# Patient Record
Sex: MALE | Race: WHITE | NOT HISPANIC OR LATINO
[De-identification: names, ages, dates, MRNs, and addresses within clinical notes are randomized per-mention and may not be internally consistent; named-entity substitution may affect disease eponyms.]

---

## 2017-05-09 ENCOUNTER — APPOINTMENT (OUTPATIENT)
Dept: DERMATOLOGY | Facility: CLINIC | Age: 57
End: 2017-05-09

## 2018-02-20 ENCOUNTER — APPOINTMENT (OUTPATIENT)
Dept: DERMATOLOGY | Facility: CLINIC | Age: 58
End: 2018-02-20
Payer: COMMERCIAL

## 2018-02-20 PROCEDURE — 99214 OFFICE O/P EST MOD 30 MIN: CPT

## 2018-10-25 ENCOUNTER — OUTPATIENT (OUTPATIENT)
Dept: OUTPATIENT SERVICES | Facility: HOSPITAL | Age: 58
LOS: 1 days | Discharge: HOME | End: 2018-10-25
Payer: COMMERCIAL

## 2018-10-25 VITALS
HEART RATE: 85 BPM | TEMPERATURE: 98 F | HEIGHT: 72 IN | DIASTOLIC BLOOD PRESSURE: 88 MMHG | SYSTOLIC BLOOD PRESSURE: 119 MMHG | WEIGHT: 190.04 LBS | OXYGEN SATURATION: 100 % | RESPIRATION RATE: 20 BRPM

## 2018-10-25 DIAGNOSIS — Z98.890 OTHER SPECIFIED POSTPROCEDURAL STATES: Chronic | ICD-10-CM

## 2018-10-25 DIAGNOSIS — M50.123 CERVICAL DISC DISORDER AT C6-C7 LEVEL WITH RADICULOPATHY: ICD-10-CM

## 2018-10-25 DIAGNOSIS — M43.22 FUSION OF SPINE, CERVICAL REGION: Chronic | ICD-10-CM

## 2018-10-25 DIAGNOSIS — M50.223 OTHER CERVICAL DISC DISPLACEMENT AT C6-C7 LEVEL: ICD-10-CM

## 2018-10-25 DIAGNOSIS — K21.9 GASTRO-ESOPHAGEAL REFLUX DISEASE WITHOUT ESOPHAGITIS: ICD-10-CM

## 2018-10-25 DIAGNOSIS — F32.9 MAJOR DEPRESSIVE DISORDER, SINGLE EPISODE, UNSPECIFIED: ICD-10-CM

## 2018-10-25 DIAGNOSIS — J44.9 CHRONIC OBSTRUCTIVE PULMONARY DISEASE, UNSPECIFIED: ICD-10-CM

## 2018-10-25 DIAGNOSIS — M48.02 SPINAL STENOSIS, CERVICAL REGION: ICD-10-CM

## 2018-10-25 DIAGNOSIS — F41.9 ANXIETY DISORDER, UNSPECIFIED: ICD-10-CM

## 2018-10-25 DIAGNOSIS — Z98.1 ARTHRODESIS STATUS: Chronic | ICD-10-CM

## 2018-10-25 DIAGNOSIS — Z01.818 ENCOUNTER FOR OTHER PREPROCEDURAL EXAMINATION: ICD-10-CM

## 2018-10-25 DIAGNOSIS — Z90.89 ACQUIRED ABSENCE OF OTHER ORGANS: Chronic | ICD-10-CM

## 2018-10-25 DIAGNOSIS — D69.3 IMMUNE THROMBOCYTOPENIC PURPURA: ICD-10-CM

## 2018-10-25 DIAGNOSIS — Z29.9 ENCOUNTER FOR PROPHYLACTIC MEASURES, UNSPECIFIED: ICD-10-CM

## 2018-10-25 LAB
ANION GAP SERPL CALC-SCNC: 3 MMOL/L — LOW (ref 5–17)
APPEARANCE UR: CLEAR — SIGNIFICANT CHANGE UP
APTT BLD: 35.7 SEC — SIGNIFICANT CHANGE UP (ref 27.5–37.4)
BACTERIA # UR AUTO: NEGATIVE — SIGNIFICANT CHANGE UP
BASOPHILS # BLD AUTO: 0.03 K/UL — SIGNIFICANT CHANGE UP (ref 0–0.2)
BASOPHILS NFR BLD AUTO: 0.7 % — SIGNIFICANT CHANGE UP (ref 0–2)
BILIRUB UR-MCNC: NEGATIVE — SIGNIFICANT CHANGE UP
BLD GP AB SCN SERPL QL: SIGNIFICANT CHANGE UP
BUN SERPL-MCNC: 24 MG/DL — HIGH (ref 7–23)
CALCIUM SERPL-MCNC: 8.9 MG/DL — SIGNIFICANT CHANGE UP (ref 8.5–10.1)
CHLORIDE SERPL-SCNC: 109 MMOL/L — HIGH (ref 96–108)
CO2 SERPL-SCNC: 30 MMOL/L — SIGNIFICANT CHANGE UP (ref 22–31)
COLOR SPEC: YELLOW — SIGNIFICANT CHANGE UP
CREAT SERPL-MCNC: 0.88 MG/DL — SIGNIFICANT CHANGE UP (ref 0.5–1.3)
DIFF PNL FLD: NEGATIVE — SIGNIFICANT CHANGE UP
EOSINOPHIL # BLD AUTO: 0.07 K/UL — SIGNIFICANT CHANGE UP (ref 0–0.5)
EOSINOPHIL NFR BLD AUTO: 1.6 % — SIGNIFICANT CHANGE UP (ref 0–6)
EPI CELLS # UR: NEGATIVE — SIGNIFICANT CHANGE UP
GLUCOSE SERPL-MCNC: 106 MG/DL — HIGH (ref 70–99)
GLUCOSE UR QL: NEGATIVE MG/DL — SIGNIFICANT CHANGE UP
HCT VFR BLD CALC: 45.1 % — SIGNIFICANT CHANGE UP (ref 39–50)
HGB BLD-MCNC: 14.8 G/DL — SIGNIFICANT CHANGE UP (ref 13–17)
IMM GRANULOCYTES NFR BLD AUTO: 0.2 % — SIGNIFICANT CHANGE UP (ref 0–1.5)
INR BLD: 1 RATIO — SIGNIFICANT CHANGE UP (ref 0.88–1.16)
KETONES UR-MCNC: ABNORMAL
LEUKOCYTE ESTERASE UR-ACNC: ABNORMAL
LYMPHOCYTES # BLD AUTO: 1.04 K/UL — SIGNIFICANT CHANGE UP (ref 1–3.3)
LYMPHOCYTES # BLD AUTO: 23.9 % — SIGNIFICANT CHANGE UP (ref 13–44)
MCHC RBC-ENTMCNC: 29.2 PG — SIGNIFICANT CHANGE UP (ref 27–34)
MCHC RBC-ENTMCNC: 32.8 GM/DL — SIGNIFICANT CHANGE UP (ref 32–36)
MCV RBC AUTO: 89 FL — SIGNIFICANT CHANGE UP (ref 80–100)
MONOCYTES # BLD AUTO: 0.49 K/UL — SIGNIFICANT CHANGE UP (ref 0–0.9)
MONOCYTES NFR BLD AUTO: 11.3 % — SIGNIFICANT CHANGE UP (ref 2–14)
MRSA PCR RESULT.: SIGNIFICANT CHANGE UP
NEUTROPHILS # BLD AUTO: 2.71 K/UL — SIGNIFICANT CHANGE UP (ref 1.8–7.4)
NEUTROPHILS NFR BLD AUTO: 62.3 % — SIGNIFICANT CHANGE UP (ref 43–77)
NITRITE UR-MCNC: NEGATIVE — SIGNIFICANT CHANGE UP
NRBC # BLD: 0 /100 WBCS — SIGNIFICANT CHANGE UP (ref 0–0)
PH UR: 5 — SIGNIFICANT CHANGE UP (ref 5–8)
PLATELET # BLD AUTO: 118 K/UL — LOW (ref 150–400)
POTASSIUM SERPL-MCNC: 5.1 MMOL/L — SIGNIFICANT CHANGE UP (ref 3.5–5.3)
POTASSIUM SERPL-SCNC: 5.1 MMOL/L — SIGNIFICANT CHANGE UP (ref 3.5–5.3)
PROT UR-MCNC: 15 MG/DL
PROTHROM AB SERPL-ACNC: 10.8 SEC — SIGNIFICANT CHANGE UP (ref 9.8–12.7)
RBC # BLD: 5.07 M/UL — SIGNIFICANT CHANGE UP (ref 4.2–5.8)
RBC # FLD: 13.1 % — SIGNIFICANT CHANGE UP (ref 10.3–14.5)
RBC CASTS # UR COMP ASSIST: SIGNIFICANT CHANGE UP /HPF (ref 0–4)
S AUREUS DNA NOSE QL NAA+PROBE: SIGNIFICANT CHANGE UP
SODIUM SERPL-SCNC: 142 MMOL/L — SIGNIFICANT CHANGE UP (ref 135–145)
SP GR SPEC: 1.02 — SIGNIFICANT CHANGE UP (ref 1.01–1.02)
TYPE + AB SCN PNL BLD: SIGNIFICANT CHANGE UP
UROBILINOGEN FLD QL: NEGATIVE MG/DL — SIGNIFICANT CHANGE UP
WBC # BLD: 4.35 K/UL — SIGNIFICANT CHANGE UP (ref 3.8–10.5)
WBC # FLD AUTO: 4.35 K/UL — SIGNIFICANT CHANGE UP (ref 3.8–10.5)
WBC UR QL: SIGNIFICANT CHANGE UP

## 2018-10-25 PROCEDURE — 93010 ELECTROCARDIOGRAM REPORT: CPT

## 2018-10-25 NOTE — H&P PST ADULT - HISTORY OF PRESENT ILLNESS
59 y/o male with cervical spinal stenosis. Complain of 57 y/o male with cervical spinal stenosis. Pt reports history of cervical fusion in 2012. Complain of chronic neck pain. Takes oxycodone with mild pain relief. Pt stated "I had the shots and the ablation too but it did not relieve the pain." Scheduled for C6-7 cervical diskectomy fusion, instrumentation, allograft.

## 2018-10-25 NOTE — H&P PST ADULT - PSH
Cervical vertebral fusion  05/2012  H/O arthroscopy of left knee  04/2017  H/O bilateral inguinal hernia repair  2015  History of arthroscopy of right shoulder  rotator cuff repair 05/2018  History of lumbar spinal fusion  2014 TLIF  History of tonsillectomy    S/P ACL repair  left knee

## 2018-10-25 NOTE — H&P PST ADULT - PMH
Anxiety    Cataract    Chronic ITP (idiopathic thrombocytopenia)    Depression    Diverticulosis    GERD (gastroesophageal reflux disease)    Spinal stenosis of cervical region

## 2018-10-25 NOTE — H&P PST ADULT - NSANTHOSAYNRD_GEN_A_CORE
No. CARLOS ALBERTO screening performed.  STOP BANG Legend: 0-2 = LOW Risk; 3-4 = INTERMEDIATE Risk; 5-8 = HIGH Risk

## 2018-10-25 NOTE — H&P PST ADULT - FAMILY HISTORY
Father  Still living? Unknown  Family history of Alzheimer's disease, Age at diagnosis: Age Unknown     Mother  Still living? Unknown  Family history of colon cancer in mother, Age at diagnosis: Age Unknown     Sibling  Still living? Unknown  Family history of Alzheimer's disease, Age at diagnosis: Age Unknown  Family history of CLL (chronic lymphoid leukemia), Age at diagnosis: Age Unknown

## 2018-10-26 ENCOUNTER — TRANSCRIPTION ENCOUNTER (OUTPATIENT)
Age: 58
End: 2018-10-26

## 2018-10-26 LAB
CULTURE RESULTS: NO GROWTH — SIGNIFICANT CHANGE UP
SPECIMEN SOURCE: SIGNIFICANT CHANGE UP

## 2018-11-07 ENCOUNTER — RESULT REVIEW (OUTPATIENT)
Age: 58
End: 2018-11-07

## 2018-11-07 ENCOUNTER — OUTPATIENT (OUTPATIENT)
Dept: OUTPATIENT SERVICES | Facility: HOSPITAL | Age: 58
LOS: 1 days | Discharge: HOME | End: 2018-11-07
Payer: COMMERCIAL

## 2018-11-07 VITALS
OXYGEN SATURATION: 100 % | DIASTOLIC BLOOD PRESSURE: 86 MMHG | WEIGHT: 190.04 LBS | SYSTOLIC BLOOD PRESSURE: 128 MMHG | HEIGHT: 72 IN | TEMPERATURE: 98 F | RESPIRATION RATE: 14 BRPM | HEART RATE: 80 BPM

## 2018-11-07 DIAGNOSIS — Z98.890 OTHER SPECIFIED POSTPROCEDURAL STATES: Chronic | ICD-10-CM

## 2018-11-07 DIAGNOSIS — M43.22 FUSION OF SPINE, CERVICAL REGION: Chronic | ICD-10-CM

## 2018-11-07 DIAGNOSIS — Z90.89 ACQUIRED ABSENCE OF OTHER ORGANS: Chronic | ICD-10-CM

## 2018-11-07 DIAGNOSIS — Z98.1 ARTHRODESIS STATUS: Chronic | ICD-10-CM

## 2018-11-07 PROCEDURE — 88304 TISSUE EXAM BY PATHOLOGIST: CPT | Mod: 26

## 2018-11-07 RX ORDER — VANCOMYCIN HCL 1 G
1000 VIAL (EA) INTRAVENOUS ONCE
Qty: 0 | Refills: 0 | Status: COMPLETED | OUTPATIENT
Start: 2018-11-07 | End: 2018-11-07

## 2018-11-07 RX ORDER — BENZOCAINE AND MENTHOL 5; 1 G/100ML; G/100ML
1 LIQUID ORAL
Qty: 0 | Refills: 0 | Status: DISCONTINUED | OUTPATIENT
Start: 2018-11-07 | End: 2018-11-08

## 2018-11-07 RX ORDER — HYDROMORPHONE HYDROCHLORIDE 2 MG/ML
0.5 INJECTION INTRAMUSCULAR; INTRAVENOUS; SUBCUTANEOUS
Qty: 0 | Refills: 0 | Status: DISCONTINUED | OUTPATIENT
Start: 2018-11-07 | End: 2018-11-08

## 2018-11-07 RX ORDER — SODIUM CHLORIDE 9 MG/ML
1000 INJECTION, SOLUTION INTRAVENOUS
Qty: 0 | Refills: 0 | Status: DISCONTINUED | OUTPATIENT
Start: 2018-11-07 | End: 2018-11-07

## 2018-11-07 RX ORDER — ACETAMINOPHEN 500 MG
650 TABLET ORAL EVERY 6 HOURS
Qty: 0 | Refills: 0 | Status: DISCONTINUED | OUTPATIENT
Start: 2018-11-07 | End: 2018-11-08

## 2018-11-07 RX ORDER — OXYCODONE HYDROCHLORIDE 5 MG/1
10 TABLET ORAL EVERY 4 HOURS
Qty: 0 | Refills: 0 | Status: DISCONTINUED | OUTPATIENT
Start: 2018-11-07 | End: 2018-11-08

## 2018-11-07 RX ORDER — CLONAZEPAM 1 MG
0.5 TABLET ORAL
Qty: 0 | Refills: 0 | Status: DISCONTINUED | OUTPATIENT
Start: 2018-11-07 | End: 2018-11-08

## 2018-11-07 RX ORDER — ASCORBIC ACID 60 MG
500 TABLET,CHEWABLE ORAL
Qty: 0 | Refills: 0 | Status: DISCONTINUED | OUTPATIENT
Start: 2018-11-07 | End: 2018-11-08

## 2018-11-07 RX ORDER — DOCUSATE SODIUM 100 MG
100 CAPSULE ORAL THREE TIMES A DAY
Qty: 0 | Refills: 0 | Status: DISCONTINUED | OUTPATIENT
Start: 2018-11-07 | End: 2018-11-08

## 2018-11-07 RX ORDER — FENTANYL CITRATE 50 UG/ML
50 INJECTION INTRAVENOUS
Qty: 0 | Refills: 0 | Status: DISCONTINUED | OUTPATIENT
Start: 2018-11-07 | End: 2018-11-07

## 2018-11-07 RX ORDER — OXYCODONE HYDROCHLORIDE 5 MG/1
5 TABLET ORAL EVERY 4 HOURS
Qty: 0 | Refills: 0 | Status: DISCONTINUED | OUTPATIENT
Start: 2018-11-07 | End: 2018-11-08

## 2018-11-07 RX ORDER — FAMOTIDINE 10 MG/ML
20 INJECTION INTRAVENOUS EVERY 24 HOURS
Qty: 0 | Refills: 0 | Status: DISCONTINUED | OUTPATIENT
Start: 2018-11-07 | End: 2018-11-08

## 2018-11-07 RX ORDER — DIPHENHYDRAMINE HCL 50 MG
25 CAPSULE ORAL EVERY 4 HOURS
Qty: 0 | Refills: 0 | Status: DISCONTINUED | OUTPATIENT
Start: 2018-11-07 | End: 2018-11-08

## 2018-11-07 RX ORDER — SODIUM CHLORIDE 9 MG/ML
1000 INJECTION, SOLUTION INTRAVENOUS
Qty: 0 | Refills: 0 | Status: DISCONTINUED | OUTPATIENT
Start: 2018-11-07 | End: 2018-11-08

## 2018-11-07 RX ORDER — OXYCODONE HYDROCHLORIDE 5 MG/1
10 TABLET ORAL ONCE
Qty: 0 | Refills: 0 | Status: DISCONTINUED | OUTPATIENT
Start: 2018-11-07 | End: 2018-11-07

## 2018-11-07 RX ORDER — FOLIC ACID 0.8 MG
1 TABLET ORAL DAILY
Qty: 0 | Refills: 0 | Status: DISCONTINUED | OUTPATIENT
Start: 2018-11-07 | End: 2018-11-08

## 2018-11-07 RX ORDER — ONDANSETRON 8 MG/1
4 TABLET, FILM COATED ORAL ONCE
Qty: 0 | Refills: 0 | Status: DISCONTINUED | OUTPATIENT
Start: 2018-11-07 | End: 2018-11-07

## 2018-11-07 RX ADMIN — OXYCODONE HYDROCHLORIDE 10 MILLIGRAM(S): 5 TABLET ORAL at 13:57

## 2018-11-07 RX ADMIN — FENTANYL CITRATE 50 MICROGRAM(S): 50 INJECTION INTRAVENOUS at 14:56

## 2018-11-07 RX ADMIN — FENTANYL CITRATE 50 MICROGRAM(S): 50 INJECTION INTRAVENOUS at 16:00

## 2018-11-07 RX ADMIN — FENTANYL CITRATE 50 MICROGRAM(S): 50 INJECTION INTRAVENOUS at 13:57

## 2018-11-07 RX ADMIN — HYDROMORPHONE HYDROCHLORIDE 0.5 MILLIGRAM(S): 2 INJECTION INTRAMUSCULAR; INTRAVENOUS; SUBCUTANEOUS at 22:32

## 2018-11-07 RX ADMIN — Medication 1 MILLIGRAM(S): at 18:28

## 2018-11-07 RX ADMIN — Medication 250 MILLIGRAM(S): at 23:35

## 2018-11-07 RX ADMIN — FENTANYL CITRATE 50 MICROGRAM(S): 50 INJECTION INTRAVENOUS at 14:13

## 2018-11-07 RX ADMIN — HYDROMORPHONE HYDROCHLORIDE 0.5 MILLIGRAM(S): 2 INJECTION INTRAMUSCULAR; INTRAVENOUS; SUBCUTANEOUS at 22:12

## 2018-11-07 RX ADMIN — FAMOTIDINE 20 MILLIGRAM(S): 10 INJECTION INTRAVENOUS at 23:36

## 2018-11-07 RX ADMIN — FENTANYL CITRATE 50 MICROGRAM(S): 50 INJECTION INTRAVENOUS at 16:30

## 2018-11-07 RX ADMIN — OXYCODONE HYDROCHLORIDE 10 MILLIGRAM(S): 5 TABLET ORAL at 18:27

## 2018-11-07 RX ADMIN — OXYCODONE HYDROCHLORIDE 10 MILLIGRAM(S): 5 TABLET ORAL at 13:52

## 2018-11-07 RX ADMIN — FENTANYL CITRATE 50 MICROGRAM(S): 50 INJECTION INTRAVENOUS at 13:54

## 2018-11-07 RX ADMIN — FENTANYL CITRATE 50 MICROGRAM(S): 50 INJECTION INTRAVENOUS at 14:55

## 2018-11-07 RX ADMIN — Medication 1 TABLET(S): at 18:27

## 2018-11-07 RX ADMIN — Medication 1 TABLET(S): at 23:37

## 2018-11-07 RX ADMIN — Medication 100 MILLIGRAM(S): at 23:36

## 2018-11-07 RX ADMIN — Medication 500 MILLIGRAM(S): at 18:28

## 2018-11-07 NOTE — PHYSICAL THERAPY INITIAL EVALUATION ADULT - CRITERIA FOR SKILLED THERAPEUTIC INTERVENTIONS
functional limitations in following categories/predicted duration of therapy intervention/anticipated equipment needs at discharge/risk reduction/prevention/anticipated discharge recommendation/impairments found/rehab potential/therapy frequency

## 2018-11-07 NOTE — PHYSICAL THERAPY INITIAL EVALUATION ADULT - PLANNED THERAPY INTERVENTIONS, PT EVAL
bed mobility training/gait training/orthotic fitting/training/stairs ,neck protection ,fall prevention/transfer training

## 2018-11-07 NOTE — PHYSICAL THERAPY INITIAL EVALUATION ADULT - REFERRING PHYSICIAN, REHAB EVAL
Dr ASHU Dejesus 11/7/18 @1401pm Dr Trina Coats DO (Ortho Resident) Attending :Dr ASHU Dejesus 11/7/18 @1401pm

## 2018-11-07 NOTE — PHYSICAL THERAPY INITIAL EVALUATION ADULT - GENERAL OBSERVATIONS, REHAB EVAL
recumbent in bed with soft c-collar in place,nasal o2 ,Flowtron cuffs BLEs but no pump yet,awake,alert,Ox3

## 2018-11-07 NOTE — PHYSICAL THERAPY INITIAL EVALUATION ADULT - OCCUPATION
pt is disabled since first neck surgery 2013 ,had worked as  for [employer], [employer] prior to that [employer] ; served in [employer]

## 2018-11-07 NOTE — PHYSICAL THERAPY INITIAL EVALUATION ADULT - PERTINENT HX OF CURRENT PROBLEM, REHAB EVAL
C/S stenosis,persistent progressive posterior neck pain radiating down to interscapular area despite pain management

## 2018-11-07 NOTE — ASU PATIENT PROFILE, ADULT - PROVIDER NOTIFICATION
----- Message from Barrington Bishop NP sent at 5/31/2018  8:05 AM CDT -----  Patient needs to get back on crestor and repeat cmp lipid in 3mo then f/u  Vit D low, needs 50,000 units weekly x6 weeks then 2000 units otc daily  
Pt notified  
Declines

## 2018-11-07 NOTE — BRIEF OPERATIVE NOTE - PROCEDURE
<<-----Click on this checkbox to enter Procedure Anterior cervical discectomy and fusion (ACDF) with plating and bone graft at single level  11/07/2018  C6-7 anterior cervical diskectomy fusion instrumentation allograft  [facility]

## 2018-11-07 NOTE — PHYSICAL THERAPY INITIAL EVALUATION ADULT - PATIENT PROFILE REVIEW, REHAB EVAL
Activity level:ambulate with assistance,soft cervical collar at all times/yes Activity level: ambulate with assistance, soft cervical collar at all times/yes

## 2018-11-08 ENCOUNTER — TRANSCRIPTION ENCOUNTER (OUTPATIENT)
Age: 58
End: 2018-11-08

## 2018-11-08 VITALS
DIASTOLIC BLOOD PRESSURE: 71 MMHG | TEMPERATURE: 98 F | SYSTOLIC BLOOD PRESSURE: 118 MMHG | RESPIRATION RATE: 16 BRPM | HEART RATE: 88 BPM | OXYGEN SATURATION: 94 %

## 2018-11-08 RX ADMIN — OXYCODONE HYDROCHLORIDE 5 MILLIGRAM(S): 5 TABLET ORAL at 05:28

## 2018-11-08 RX ADMIN — OXYCODONE HYDROCHLORIDE 10 MILLIGRAM(S): 5 TABLET ORAL at 03:03

## 2018-11-08 RX ADMIN — Medication 500 MILLIGRAM(S): at 05:30

## 2018-11-08 RX ADMIN — OXYCODONE HYDROCHLORIDE 10 MILLIGRAM(S): 5 TABLET ORAL at 03:33

## 2018-11-08 RX ADMIN — OXYCODONE HYDROCHLORIDE 10 MILLIGRAM(S): 5 TABLET ORAL at 09:35

## 2018-11-08 RX ADMIN — OXYCODONE HYDROCHLORIDE 5 MILLIGRAM(S): 5 TABLET ORAL at 05:58

## 2018-11-08 RX ADMIN — Medication 100 MILLIGRAM(S): at 05:28

## 2018-11-08 RX ADMIN — Medication 0.5 MILLIGRAM(S): at 05:28

## 2018-11-08 RX ADMIN — Medication 1 TABLET(S): at 05:30

## 2018-11-08 NOTE — PROGRESS NOTE ADULT - SUBJECTIVE AND OBJECTIVE BOX
Patient seen and examined. Pain controlled. No acute events.  Pt notes mild dysphagia, no difficulty breathing/SOB.     Allergies    penicillins (Other (Severe))    Intolerances      Vital Signs Last 24 Hrs  T(C): 36.4 (11-08-18 @ 03:10), Max: 36.7 (11-07-18 @ 16:15)  T(F): 97.5 (11-08-18 @ 03:10), Max: 98.1 (11-07-18 @ 17:55)  HR: 88 (11-08-18 @ 03:10) (80 - 104)  BP: 118/71 (11-08-18 @ 03:10) (101/59 - 133/79)  BP(mean): --  RR: 16 (11-08-18 @ 03:10) (11 - 18)  SpO2: 94% (11-08-18 @ 03:10) (94% - 100%)    Physical Exam    Gen: NAD    Spine:   Dressing c/d/i, +soft collar in place    Motor:            ElbowFlex    WristExt   ElbowExt   FingerFlex   FingerAbd     R            5/5                5/5            5/5             5/5               5/5  L            5/5                5/5            5/5             5/5               5/5              HipFlex   HipExt   KneeFlex   KneeExt   AnkleDorsi   AnklePlantar   HaluxDorsi   HaluxPlantar  R        5/5        5/5            5/5            5/5             5/5                 5/5                    5/5                5/5  L         5/5        5/5            5/5            5/5             5/5                 5/5                    5/5                5/5      Sensory:            C5         C6         C7      C8       T1        (0=absent, 1=impaired, 2=normal, NT=not testable)  R         2            2           2        2          2  L          2            2           2        2          2               L2          L3         L4      L5       S1         (0=absent, 1=impaired, 2=normal, NT=not testable)  R       2            2           2        2          2  L         2            2           2        2          2      DP +  Compartments soft  No calf ttp    A/P: 58y Male s/p C6-7 ACDF POD 1  Pain control  DVT ppx- SCDs  PT/OT, WBAT  FU labs  Dispo planning

## 2018-11-08 NOTE — DISCHARGE NOTE ADULT - CARE PROVIDER_API CALL
Ivan Dejesus), Orthopaedic Surgery  [address] [place] [address]  Phone: [phone]  Fax: [phone]

## 2018-11-08 NOTE — PROGRESS NOTE ADULT - SUBJECTIVE AND OBJECTIVE BOX
POD1  Doing well  Mild dysphagia  Ambulated  No new UE issues  Soft collar in place    AF, VSS  Moving arms strongly  Sensation intact  Dressing anterior C spine dry    A/P  S/p C6-7 anterior cervical diskectomy and fusion POD1  Pt is stable for DC home  All question answered  Wife at bedside

## 2018-11-08 NOTE — DISCHARGE NOTE ADULT - CARE PLAN
Principal Discharge DX:	Cervical vertebral fusion  Goal:	improve pain  Assessment and plan of treatment:	Keep incisional site clean and dry. Avoid soaking incisional site.

## 2018-11-08 NOTE — PROGRESS NOTE ADULT - SUBJECTIVE AND OBJECTIVE BOX
POD#1. Pt seen resting in bed. Pt has mild pain of the posterior aspect of cervical region which is tolerable. Pt denies upper extremities pain, numbness, and weakness. Pt voided on own without complications. No dysphagia.    PE  Gen appearance: NAD  Motor strength: 5/5 of b/l upper ext  Sensation: intact  No calf tenderness bilaterally  Incisional site: clean and dry.    Plan  VSS, Afebrile  Stable for DC home  Post-op pain meds will be sent from office EMR POD#1. Pt seen resting in bed. Pt has mild pain of the posterior aspect of cervical region which is tolerable. Pt denies upper extremities pain, numbness, and weakness. Pt voided on own without complications. No dysphagia.    PE  Gen appearance: NAD  Motor strength: 5/5 of b/l upper ext  Sensation: intact  No calf tenderness bilaterally  Incisional site: clean and dry.    Plan  VSS, Afebrile  Stable for DC home  Pt has pain meds prescribed by Pain management and has enough. Pt will f/u with Pain management.

## 2018-11-08 NOTE — DISCHARGE NOTE ADULT - PATIENT PORTAL LINK FT
You can access the [facility] Patient Portal, offered by [facility], by registering with the following website: [email or web address]

## 2018-11-08 NOTE — DISCHARGE NOTE ADULT - HOSPITAL COURSE
11/7/2018: s/p C6-7 ACDF, previous C5-6 ACDF  11/8/2018: POD#1. Pt seen resting in bed. Pt has mild pain of the posterior aspect of cervical region which is tolerable. Pt denies upper extremities pain, numbness, and weakness. Pt voided on own without complications. No dysphagia.    PE  Gen appearance: NAD  Motor strength: 5/5 of b/l upper ext  Sensation: intact  No calf tenderness bilaterally  Incisional site: clean and dry.    Plan  VSS, Afebrile  Stable for DC home  Post-op pain meds will be sent from office EMR 11/7/2018: s/p C6-7 ACDF, previous C5-6 ACDF  11/8/2018: POD#1. Pt seen resting in bed. Pt has mild pain of the posterior aspect of cervical region which is tolerable. Pt denies upper extremities pain, numbness, and weakness. Pt voided on own without complications. No dysphagia.    PE  Gen appearance: NAD  Motor strength: 5/5 of b/l upper ext  Sensation: intact  No calf tenderness bilaterally  Incisional site: clean and dry.    Plan  VSS, Afebrile  Stable for DC home  Pt has pain meds prescribed by Pain management and has enough. Pt will f/u with Pain management.

## 2018-11-08 NOTE — DISCHARGE NOTE ADULT - MEDICATION SUMMARY - MEDICATIONS TO STOP TAKING
I will STOP taking the medications listed below when I get home from the hospital:    oxyCODONE-acetaminophen 5 mg-325 mg oral tablet  -- 1 tab(s) by mouth 2 times a day, As Needed

## 2018-11-08 NOTE — DISCHARGE NOTE ADULT - MEDICATION SUMMARY - MEDICATIONS TO TAKE
I will START or STAY ON the medications listed below when I get home from the hospital:    clonazePAM 2 mg oral tablet  -- 0.5 tab(s) by mouth 2 times a day  -- Indication: For Home med    nefazodone 150 mg oral tablet  -- 1 tab(s) by mouth once a day  -- Indication: For Home med    raNITIdine 150 mg oral capsule  -- 1 cap(s) by mouth once a day, As Needed  -- Indication: For Home

## 2018-11-09 LAB — SURGICAL PATHOLOGY FINAL REPORT - CH: SIGNIFICANT CHANGE UP

## 2018-11-13 DIAGNOSIS — F41.9 ANXIETY DISORDER, UNSPECIFIED: ICD-10-CM

## 2018-11-13 DIAGNOSIS — Z87.891 PERSONAL HISTORY OF NICOTINE DEPENDENCE: ICD-10-CM

## 2018-11-13 DIAGNOSIS — Z80.6 FAMILY HISTORY OF LEUKEMIA: ICD-10-CM

## 2018-11-13 DIAGNOSIS — Z88.0 ALLERGY STATUS TO PENICILLIN: ICD-10-CM

## 2018-11-13 DIAGNOSIS — K21.9 GASTRO-ESOPHAGEAL REFLUX DISEASE WITHOUT ESOPHAGITIS: ICD-10-CM

## 2018-11-13 DIAGNOSIS — M50.123 CERVICAL DISC DISORDER AT C6-C7 LEVEL WITH RADICULOPATHY: ICD-10-CM

## 2018-11-13 DIAGNOSIS — J44.9 CHRONIC OBSTRUCTIVE PULMONARY DISEASE, UNSPECIFIED: ICD-10-CM

## 2018-11-13 DIAGNOSIS — D69.3 IMMUNE THROMBOCYTOPENIC PURPURA: ICD-10-CM

## 2018-11-13 DIAGNOSIS — M48.02 SPINAL STENOSIS, CERVICAL REGION: ICD-10-CM

## 2018-11-13 DIAGNOSIS — E78.00 PURE HYPERCHOLESTEROLEMIA, UNSPECIFIED: ICD-10-CM

## 2018-11-13 DIAGNOSIS — F32.9 MAJOR DEPRESSIVE DISORDER, SINGLE EPISODE, UNSPECIFIED: ICD-10-CM

## 2018-11-13 DIAGNOSIS — L50.9 URTICARIA, UNSPECIFIED: ICD-10-CM

## 2018-11-13 DIAGNOSIS — Z80.0 FAMILY HISTORY OF MALIGNANT NEOPLASM OF DIGESTIVE ORGANS: ICD-10-CM

## 2018-11-13 DIAGNOSIS — Z98.1 ARTHRODESIS STATUS: ICD-10-CM

## 2018-11-13 DIAGNOSIS — L30.9 DERMATITIS, UNSPECIFIED: ICD-10-CM

## 2018-12-06 PROBLEM — K21.9 GASTRO-ESOPHAGEAL REFLUX DISEASE WITHOUT ESOPHAGITIS: Chronic | Status: ACTIVE | Noted: 2018-10-25

## 2018-12-06 PROBLEM — D69.3 IMMUNE THROMBOCYTOPENIC PURPURA: Chronic | Status: ACTIVE | Noted: 2018-10-25

## 2018-12-06 PROBLEM — F41.9 ANXIETY DISORDER, UNSPECIFIED: Chronic | Status: ACTIVE | Noted: 2018-10-25

## 2018-12-06 PROBLEM — F32.9 MAJOR DEPRESSIVE DISORDER, SINGLE EPISODE, UNSPECIFIED: Chronic | Status: ACTIVE | Noted: 2018-10-25

## 2018-12-06 PROBLEM — H26.9 UNSPECIFIED CATARACT: Chronic | Status: ACTIVE | Noted: 2018-10-25

## 2018-12-06 PROBLEM — M48.02 SPINAL STENOSIS, CERVICAL REGION: Chronic | Status: ACTIVE | Noted: 2018-10-25

## 2018-12-06 PROBLEM — K57.90 DIVERTICULOSIS OF INTESTINE, PART UNSPECIFIED, WITHOUT PERFORATION OR ABSCESS WITHOUT BLEEDING: Chronic | Status: ACTIVE | Noted: 2018-10-25

## 2018-12-11 ENCOUNTER — APPOINTMENT (OUTPATIENT)
Dept: DERMATOLOGY | Facility: CLINIC | Age: 58
End: 2018-12-11
Payer: COMMERCIAL

## 2018-12-11 PROCEDURE — 99214 OFFICE O/P EST MOD 30 MIN: CPT | Mod: 25

## 2018-12-11 PROCEDURE — 17000 DESTRUCT PREMALG LESION: CPT

## 2018-12-11 PROCEDURE — 17003 DESTRUCT PREMALG LES 2-14: CPT

## 2019-03-22 ENCOUNTER — APPOINTMENT (OUTPATIENT)
Dept: DERMATOLOGY | Facility: CLINIC | Age: 59
End: 2019-03-22
Payer: COMMERCIAL

## 2019-03-22 PROCEDURE — 99212 OFFICE O/P EST SF 10 MIN: CPT

## 2020-01-07 ENCOUNTER — TRANSCRIPTION ENCOUNTER (OUTPATIENT)
Age: 60
End: 2020-01-07

## 2020-01-21 ENCOUNTER — EMERGENCY (EMERGENCY)
Facility: HOSPITAL | Age: 60
LOS: 1 days | Discharge: HOME | End: 2020-01-21
Attending: EMERGENCY MEDICINE
Payer: MEDICARE

## 2020-01-21 VITALS
TEMPERATURE: 97 F | SYSTOLIC BLOOD PRESSURE: 152 MMHG | HEIGHT: 71 IN | RESPIRATION RATE: 18 BRPM | WEIGHT: 214.95 LBS | DIASTOLIC BLOOD PRESSURE: 89 MMHG | HEART RATE: 81 BPM | OXYGEN SATURATION: 97 %

## 2020-01-21 DIAGNOSIS — M43.22 FUSION OF SPINE, CERVICAL REGION: Chronic | ICD-10-CM

## 2020-01-21 DIAGNOSIS — Z98.890 OTHER SPECIFIED POSTPROCEDURAL STATES: Chronic | ICD-10-CM

## 2020-01-21 DIAGNOSIS — Z98.1 ARTHRODESIS STATUS: Chronic | ICD-10-CM

## 2020-01-21 DIAGNOSIS — Z90.89 ACQUIRED ABSENCE OF OTHER ORGANS: Chronic | ICD-10-CM

## 2020-01-21 LAB
ALBUMIN SERPL ELPH-MCNC: 4.5 G/DL — SIGNIFICANT CHANGE UP (ref 3.3–5.2)
ALP SERPL-CCNC: 76 U/L — SIGNIFICANT CHANGE UP (ref 40–120)
ALT FLD-CCNC: 28 U/L — SIGNIFICANT CHANGE UP
ANION GAP SERPL CALC-SCNC: 12 MMOL/L — SIGNIFICANT CHANGE UP (ref 5–17)
AST SERPL-CCNC: 26 U/L — SIGNIFICANT CHANGE UP
BASOPHILS # BLD AUTO: 0.02 K/UL — SIGNIFICANT CHANGE UP (ref 0–0.2)
BASOPHILS NFR BLD AUTO: 0.2 % — SIGNIFICANT CHANGE UP (ref 0–2)
BILIRUB SERPL-MCNC: 0.4 MG/DL — SIGNIFICANT CHANGE UP (ref 0.4–2)
BUN SERPL-MCNC: 19 MG/DL — SIGNIFICANT CHANGE UP (ref 8–20)
CALCIUM SERPL-MCNC: 9.3 MG/DL — SIGNIFICANT CHANGE UP (ref 8.6–10.2)
CHLORIDE SERPL-SCNC: 101 MMOL/L — SIGNIFICANT CHANGE UP (ref 98–107)
CO2 SERPL-SCNC: 28 MMOL/L — SIGNIFICANT CHANGE UP (ref 22–29)
CREAT SERPL-MCNC: 0.84 MG/DL — SIGNIFICANT CHANGE UP (ref 0.5–1.3)
EOSINOPHIL # BLD AUTO: 0.01 K/UL — SIGNIFICANT CHANGE UP (ref 0–0.5)
EOSINOPHIL NFR BLD AUTO: 0.1 % — SIGNIFICANT CHANGE UP (ref 0–6)
GLUCOSE SERPL-MCNC: 152 MG/DL — HIGH (ref 70–99)
HCT VFR BLD CALC: 45 % — SIGNIFICANT CHANGE UP (ref 39–50)
HGB BLD-MCNC: 14.8 G/DL — SIGNIFICANT CHANGE UP (ref 13–17)
IMM GRANULOCYTES NFR BLD AUTO: 0.2 % — SIGNIFICANT CHANGE UP (ref 0–1.5)
LYMPHOCYTES # BLD AUTO: 0.8 K/UL — LOW (ref 1–3.3)
LYMPHOCYTES # BLD AUTO: 8.3 % — LOW (ref 13–44)
MCHC RBC-ENTMCNC: 28.3 PG — SIGNIFICANT CHANGE UP (ref 27–34)
MCHC RBC-ENTMCNC: 32.9 GM/DL — SIGNIFICANT CHANGE UP (ref 32–36)
MCV RBC AUTO: 86 FL — SIGNIFICANT CHANGE UP (ref 80–100)
MONOCYTES # BLD AUTO: 0.55 K/UL — SIGNIFICANT CHANGE UP (ref 0–0.9)
MONOCYTES NFR BLD AUTO: 5.7 % — SIGNIFICANT CHANGE UP (ref 2–14)
NEUTROPHILS # BLD AUTO: 8.2 K/UL — HIGH (ref 1.8–7.4)
NEUTROPHILS NFR BLD AUTO: 85.5 % — HIGH (ref 43–77)
PLATELET # BLD AUTO: 107 K/UL — LOW (ref 150–400)
POTASSIUM SERPL-MCNC: 5.3 MMOL/L — SIGNIFICANT CHANGE UP (ref 3.5–5.3)
POTASSIUM SERPL-SCNC: 5.3 MMOL/L — SIGNIFICANT CHANGE UP (ref 3.5–5.3)
PROT SERPL-MCNC: 7 G/DL — SIGNIFICANT CHANGE UP (ref 6.6–8.7)
RBC # BLD: 5.23 M/UL — SIGNIFICANT CHANGE UP (ref 4.2–5.8)
RBC # FLD: 13 % — SIGNIFICANT CHANGE UP (ref 10.3–14.5)
SODIUM SERPL-SCNC: 141 MMOL/L — SIGNIFICANT CHANGE UP (ref 135–145)
WBC # BLD: 9.6 K/UL — SIGNIFICANT CHANGE UP (ref 3.8–10.5)
WBC # FLD AUTO: 9.6 K/UL — SIGNIFICANT CHANGE UP (ref 3.8–10.5)

## 2020-01-21 PROCEDURE — 96361 HYDRATE IV INFUSION ADD-ON: CPT

## 2020-01-21 PROCEDURE — 71046 X-RAY EXAM CHEST 2 VIEWS: CPT | Mod: 26

## 2020-01-21 PROCEDURE — 93005 ELECTROCARDIOGRAM TRACING: CPT

## 2020-01-21 PROCEDURE — 85027 COMPLETE CBC AUTOMATED: CPT

## 2020-01-21 PROCEDURE — 99284 EMERGENCY DEPT VISIT MOD MDM: CPT | Mod: 25

## 2020-01-21 PROCEDURE — 99284 EMERGENCY DEPT VISIT MOD MDM: CPT

## 2020-01-21 PROCEDURE — 80053 COMPREHEN METABOLIC PANEL: CPT

## 2020-01-21 PROCEDURE — 93010 ELECTROCARDIOGRAM REPORT: CPT

## 2020-01-21 PROCEDURE — 81001 URINALYSIS AUTO W/SCOPE: CPT

## 2020-01-21 PROCEDURE — 71046 X-RAY EXAM CHEST 2 VIEWS: CPT

## 2020-01-21 PROCEDURE — 96374 THER/PROPH/DIAG INJ IV PUSH: CPT

## 2020-01-21 PROCEDURE — 36415 COLL VENOUS BLD VENIPUNCTURE: CPT

## 2020-01-21 RX ORDER — DIPHENHYDRAMINE HCL 50 MG
25 CAPSULE ORAL ONCE
Refills: 0 | Status: COMPLETED | OUTPATIENT
Start: 2020-01-21 | End: 2020-01-21

## 2020-01-21 RX ORDER — ONDANSETRON 8 MG/1
4 TABLET, FILM COATED ORAL ONCE
Refills: 0 | Status: COMPLETED | OUTPATIENT
Start: 2020-01-21 | End: 2020-01-21

## 2020-01-21 RX ORDER — SODIUM CHLORIDE 9 MG/ML
1000 INJECTION INTRAMUSCULAR; INTRAVENOUS; SUBCUTANEOUS ONCE
Refills: 0 | Status: COMPLETED | OUTPATIENT
Start: 2020-01-21 | End: 2020-01-21

## 2020-01-21 RX ADMIN — SODIUM CHLORIDE 1000 MILLILITER(S): 9 INJECTION INTRAMUSCULAR; INTRAVENOUS; SUBCUTANEOUS at 22:45

## 2020-01-21 RX ADMIN — SODIUM CHLORIDE 1000 MILLILITER(S): 9 INJECTION INTRAMUSCULAR; INTRAVENOUS; SUBCUTANEOUS at 21:45

## 2020-01-21 RX ADMIN — Medication 25 MILLIGRAM(S): at 22:39

## 2020-01-21 NOTE — ED ADULT NURSE NOTE - OBJECTIVE STATEMENT
Assumed pt care, pt sitting in stretcher, no acute distress noted. Pt states today he started using Suboxone to stop using his Percocet 5/325, pt took first dose of Suboxone at 4pm today, had feelings of dry mouth, feeling "drunk", unable to talk/walk normally, feeling very "off," and began feeling SOB, came into ED at that time.

## 2020-01-21 NOTE — ED PROVIDER NOTE - CLINICAL SUMMARY MEDICAL DECISION MAKING FREE TEXT BOX
pt with multiple symptoms after taking suboxone x 1 for the first time.  at this time pe normal  will treat symptomatically and reassess

## 2020-01-21 NOTE — ED PROVIDER NOTE - OBJECTIVE STATEMENT
60 yo male with hx of multiple cervical spine operations, including fusions. Has been on percocet one pill three times a day for a long time and wanted to come off of it. He was at his psychiatrist, who he sees for anxiety, and the psychiatrist, who also does addiction medicine, offered to write a script for suboxone. Pt did not take the med after the script was written 4 days ago. This afternoon he took the suboxone and as per patient, within an hour, he felt " wasted". He says he could not drive and felt sob. He states he felt out of body. He also describes throat tightness and says he could not swallow. Admits to emesis. Family drove him here for evaluation.  Med hx chronic pain, anxiety  Soc hx no cig

## 2020-01-21 NOTE — ED PROVIDER NOTE - PROGRESS NOTE DETAILS
ambulatory and tolerating liquids  drowsy from benadryl  safe to dc  no other meds tongiht  fu with psych tomorrow

## 2020-01-21 NOTE — ED STATDOCS - PROGRESS NOTE DETAILS
[facility] for ED attending, Dr. Sequeira. 60 y/o male with PMHx of Anxiety, ITP, Depression, Diverticulosis, GERD, and Spinal Stenosis presents to ED c/o medication reaction. Patient states at 530am today, he took a Percocet, then 4pm he took Suboxone 8mg. States that 1 hour after he took the Suboxone, he felt tired, felt he couldn't swallow, SOB, anxious, couldn't walk right, couldn't eat, became nauseous and vomited. Focused eval, protocol orders entered. Pt to be moved to main ED for further evaluation by another provider.

## 2020-01-21 NOTE — ED STATDOCS - OBJECTIVE STATEMENT
60 y/o male with PMHx of Anxiety, ITP, Depression, Diverticulosis, GERD, and Spinal Stenosis presents to ED c/o medication reaction. Patient states at 530am today, he took a Percocet, then 4pm he took Suboxone 8mg. States that 1 hour after he took the Suboxone, he felt tired, felt he couldn't swallow, SOB, anxious, couldn't walk right, couldn't eat, became nauseous and vomited. Focused eval, protocol orders entered. Pt to be moved to main ED for further evaluation by another provider.

## 2020-01-21 NOTE — ED PROVIDER NOTE - NSFOLLOWUPINSTRUCTIONS_ED_ALL_ED_FT
home and rest  follow up with the psychiatrist tomorrow  do not take any more suboxone  return for any problems

## 2020-01-21 NOTE — ED ADULT TRIAGE NOTE - CHIEF COMPLAINT QUOTE
pt presents c/o taking buprenorp-nalox today at 4pm and in the past hour, started feeling mouth and throat dryness. denies swelling to tongue or throat. denies hives or feeling itchy. pt able to talk without difficulty. no difficulty breathing noted in triage. dry oral mucosa noted in triage.

## 2020-01-21 NOTE — ED PROVIDER NOTE - PATIENT PORTAL LINK FT
You can access the FollowMyHealth Patient Portal offered by [facility] by registering at the following website: [email or web address]. By joining [employer]’s FollowMyHealth portal, you will also be able to view your health information using other applications (apps) compatible with our system.

## 2020-01-22 VITALS
OXYGEN SATURATION: 96 % | SYSTOLIC BLOOD PRESSURE: 148 MMHG | DIASTOLIC BLOOD PRESSURE: 68 MMHG | HEART RATE: 76 BPM | RESPIRATION RATE: 18 BRPM

## 2020-01-22 LAB
APPEARANCE UR: CLEAR — SIGNIFICANT CHANGE UP
BILIRUB UR-MCNC: NEGATIVE — SIGNIFICANT CHANGE UP
COLOR SPEC: YELLOW — SIGNIFICANT CHANGE UP
DIFF PNL FLD: NEGATIVE — SIGNIFICANT CHANGE UP
GLUCOSE UR QL: NEGATIVE MG/DL — SIGNIFICANT CHANGE UP
KETONES UR-MCNC: NEGATIVE — SIGNIFICANT CHANGE UP
LEUKOCYTE ESTERASE UR-ACNC: NEGATIVE — SIGNIFICANT CHANGE UP
NITRITE UR-MCNC: NEGATIVE — SIGNIFICANT CHANGE UP
PH UR: 6 — SIGNIFICANT CHANGE UP (ref 5–8)
PROT UR-MCNC: 15 MG/DL
SP GR SPEC: 1.02 — SIGNIFICANT CHANGE UP (ref 1.01–1.02)
UROBILINOGEN FLD QL: NEGATIVE MG/DL — SIGNIFICANT CHANGE UP

## 2020-02-26 ENCOUNTER — APPOINTMENT (OUTPATIENT)
Dept: DERMATOLOGY | Facility: CLINIC | Age: 60
End: 2020-02-26
Payer: MEDICARE

## 2020-02-26 PROCEDURE — 99214 OFFICE O/P EST MOD 30 MIN: CPT | Mod: 25

## 2020-02-26 PROCEDURE — 17110 DESTRUCTION B9 LES UP TO 14: CPT

## 2020-07-11 ENCOUNTER — APPOINTMENT (OUTPATIENT)
Dept: DISASTER EMERGENCY | Facility: CLINIC | Age: 60
End: 2020-07-11

## 2020-07-12 DIAGNOSIS — Z01.818 ENCOUNTER FOR OTHER PREPROCEDURAL EXAMINATION: ICD-10-CM

## 2020-07-13 ENCOUNTER — APPOINTMENT (OUTPATIENT)
Dept: DISASTER EMERGENCY | Facility: CLINIC | Age: 60
End: 2020-07-13

## 2020-07-14 LAB — SARS-COV-2 N GENE NPH QL NAA+PROBE: NOT DETECTED

## 2020-08-15 ENCOUNTER — APPOINTMENT (OUTPATIENT)
Dept: DISASTER EMERGENCY | Facility: CLINIC | Age: 60
End: 2020-08-15

## 2020-08-16 LAB — SARS-COV-2 N GENE NPH QL NAA+PROBE: NOT DETECTED

## 2020-08-27 NOTE — DISCHARGE NOTE ADULT - FUNCTIONAL SCREEN CURRENT LEVEL: AMBULATION, MLM
I read Tabby on her cell phone she is doing well.  She did have some nausea last night and took a couple of the Zofran ODT.  She has had some crackers yesterday and toast today but overall is feeling well.  She has voided well.  Slept well.  No complaints.  Discussed that we will see her back on the 10th as scheduled as long she is doing well if she has any problems in the meantime she should give us a call and she voiced understanding.  
2 = assistive person

## 2020-11-10 NOTE — ED ADULT NURSE NOTE - SUICIDE SCREENING QUESTION 2
Called patient and she has already been scheduled for diagnostic mammogram and breast ultrasound but would like an explanation as to what was found on screening mammogram.  Will send message to [email or web address].   No

## 2021-01-22 ENCOUNTER — APPOINTMENT (OUTPATIENT)
Dept: PULMONOLOGY | Facility: CLINIC | Age: 61
End: 2021-01-22
Payer: MEDICARE

## 2021-01-22 VITALS
OXYGEN SATURATION: 98 % | SYSTOLIC BLOOD PRESSURE: 130 MMHG | WEIGHT: 206 LBS | DIASTOLIC BLOOD PRESSURE: 78 MMHG | HEIGHT: 72 IN | HEART RATE: 74 BPM | TEMPERATURE: 97.9 F | BODY MASS INDEX: 27.9 KG/M2

## 2021-01-22 DIAGNOSIS — Z23 ENCOUNTER FOR IMMUNIZATION: ICD-10-CM

## 2021-01-22 PROCEDURE — 99204 OFFICE O/P NEW MOD 45 MIN: CPT

## 2021-01-22 RX ORDER — CLONAZEPAM 2 MG/1
2 TABLET ORAL
Refills: 0 | Status: ACTIVE | COMMUNITY

## 2021-01-22 NOTE — HISTORY OF PRESENT ILLNESS
[TextBox_4] : 59 yo man referred by Dr Calix\par Has seen a doctor in this practice ?? 25 years ago and told her had COPD and possibly CARLOS ALBERTO but never treated\par \par Smoker 2-3 ppd until 20 years ago\par CXR have been nondiagnostic, last in Jan 2020\par \par Patient has snoring and his wife says that sometimes he stops breathing\par \par Retired \par  33 year, one son\par \par Recent eval for arrythnmia--Dr Villarreal--has monitor on his chest\par On Diltiazem 120 for one month\par Hx Spine surgery--cerv and lumbar\par Follows with Dr Boateng--pain management\par Hx anxiety on Serazone/Clonazepine\par No hx allergies\par Recent cutaneous zoster

## 2021-01-22 NOTE — CONSULT LETTER
[Dear  ___] : Dear  [unfilled], [FreeTextEntry1] : I had the pleasure of evaluating your patient, SUNIL KHAN , in the office today.  Please review my consultation and evaluation report that follows below.  Please do not hesitate to call me if further information is necessary or if you wish to discuss ongoing care or diagnostic work-up.   \par I very much appreciate your referral and it is a privilege to be able to provide care for your patient.\par \par Sincerely,\par  \par Mauro Daniel MD, MHCM, FACP\par Pulmonary Medicine\par  of Medicine\par [employer] [facility]\par \par [email or web address]\par [facility] [place]\par \par  [DrValentien  ___] : Dr. DAVILA

## 2021-01-22 NOTE — ASSESSMENT
[FreeTextEntry1] : 59 yo man with heavy smoking history tho stopped for twenty years\par Possible COPD noted in past but never treated\par \par No respiratory complaints in general, wheeze etc\par Will obtain PFTs.  \par \par Hx of snoring, intermittent breathing now and question of CARLOS ALBERTO in past\par Needs sleep study altho he is reluctant to do in house study--will do home study and f/u\par \par Hx arrythmias, possibly a fib and f/u by Dr Villarreal\par Hx Spine disease and pain management with narcotic meds\par Hx of anxiety on serazone and clonazepine and oxycodone

## 2021-01-30 ENCOUNTER — OUTPATIENT (OUTPATIENT)
Dept: OUTPATIENT SERVICES | Facility: HOSPITAL | Age: 61
LOS: 1 days | End: 2021-01-30
Payer: MEDICARE

## 2021-01-30 DIAGNOSIS — Z90.89 ACQUIRED ABSENCE OF OTHER ORGANS: Chronic | ICD-10-CM

## 2021-01-30 DIAGNOSIS — M43.22 FUSION OF SPINE, CERVICAL REGION: Chronic | ICD-10-CM

## 2021-01-30 DIAGNOSIS — Z98.890 OTHER SPECIFIED POSTPROCEDURAL STATES: Chronic | ICD-10-CM

## 2021-01-30 DIAGNOSIS — Z98.1 ARTHRODESIS STATUS: Chronic | ICD-10-CM

## 2021-01-30 DIAGNOSIS — G47.33 OBSTRUCTIVE SLEEP APNEA (ADULT) (PEDIATRIC): ICD-10-CM

## 2021-01-30 PROCEDURE — 95806 SLEEP STUDY UNATT&RESP EFFT: CPT | Mod: 26

## 2021-01-30 PROCEDURE — G0399: CPT

## 2021-02-09 ENCOUNTER — APPOINTMENT (OUTPATIENT)
Dept: DISASTER EMERGENCY | Facility: CLINIC | Age: 61
End: 2021-02-09

## 2021-02-10 LAB — SARS-COV-2 N GENE NPH QL NAA+PROBE: NOT DETECTED

## 2021-02-12 ENCOUNTER — APPOINTMENT (OUTPATIENT)
Dept: PULMONOLOGY | Facility: CLINIC | Age: 61
End: 2021-02-12
Payer: MEDICARE

## 2021-02-12 VITALS — WEIGHT: 212 LBS | HEIGHT: 72 IN | BODY MASS INDEX: 28.71 KG/M2 | TEMPERATURE: 98 F

## 2021-02-12 VITALS
DIASTOLIC BLOOD PRESSURE: 80 MMHG | HEART RATE: 93 BPM | RESPIRATION RATE: 15 BRPM | OXYGEN SATURATION: 97 % | SYSTOLIC BLOOD PRESSURE: 130 MMHG

## 2021-02-12 PROCEDURE — 94729 DIFFUSING CAPACITY: CPT

## 2021-02-12 PROCEDURE — 94010 BREATHING CAPACITY TEST: CPT

## 2021-02-12 PROCEDURE — 85018 HEMOGLOBIN: CPT | Mod: QW

## 2021-02-12 PROCEDURE — 99214 OFFICE O/P EST MOD 30 MIN: CPT | Mod: 25

## 2021-02-12 NOTE — HISTORY OF PRESENT ILLNESS
[TextBox_4] : 61 yo man with previous heavy smoking history but not for twenty years\par Worried about COPD\par \par Here for Patrick\par \par Had home sleep study--Mild CARLOS ALBERTO

## 2021-02-12 NOTE — ASSESSMENT
[FreeTextEntry1] : 59 yo gentleman with previous smoking history\par PE is noncontributory \par CXR in Dec was unremarkable\par Patrick shows no obstruction O2 sat in normal\par Do not suspect clinically significant pulmonary disease\par \par At the urging of his wife, he went for sleep study which showed mild CARLOS ALBERTO Patient was and is reluctant to use CPAP at all\par Oral applicance might be appropriate\par He will be seeing Dr Ceballos next week for consultation\par Will see again as required

## 2021-02-17 ENCOUNTER — APPOINTMENT (OUTPATIENT)
Dept: PULMONOLOGY | Facility: CLINIC | Age: 61
End: 2021-02-17
Payer: MEDICARE

## 2021-02-17 VITALS
SYSTOLIC BLOOD PRESSURE: 112 MMHG | DIASTOLIC BLOOD PRESSURE: 74 MMHG | BODY MASS INDEX: 28.99 KG/M2 | HEART RATE: 78 BPM | OXYGEN SATURATION: 98 % | HEIGHT: 72 IN | WEIGHT: 214 LBS

## 2021-02-17 DIAGNOSIS — G47.00 INSOMNIA, UNSPECIFIED: ICD-10-CM

## 2021-02-17 DIAGNOSIS — Z86.79 PERSONAL HISTORY OF OTHER DISEASES OF THE CIRCULATORY SYSTEM: ICD-10-CM

## 2021-02-17 PROCEDURE — 99214 OFFICE O/P EST MOD 30 MIN: CPT

## 2021-02-21 ENCOUNTER — APPOINTMENT (OUTPATIENT)
Dept: DISASTER EMERGENCY | Facility: CLINIC | Age: 61
End: 2021-02-21

## 2021-02-22 LAB — SARS-COV-2 N GENE NPH QL NAA+PROBE: NOT DETECTED

## 2021-04-08 ENCOUNTER — APPOINTMENT (OUTPATIENT)
Dept: DERMATOLOGY | Facility: CLINIC | Age: 61
End: 2021-04-08
Payer: MEDICARE

## 2021-04-08 PROCEDURE — 17000 DESTRUCT PREMALG LESION: CPT

## 2021-04-08 PROCEDURE — 99214 OFFICE O/P EST MOD 30 MIN: CPT | Mod: 25

## 2021-04-08 PROCEDURE — 17003 DESTRUCT PREMALG LES 2-14: CPT

## 2021-04-14 ENCOUNTER — APPOINTMENT (OUTPATIENT)
Dept: PULMONOLOGY | Facility: CLINIC | Age: 61
End: 2021-04-14

## 2021-05-03 ENCOUNTER — EMERGENCY (EMERGENCY)
Facility: HOSPITAL | Age: 61
LOS: 1 days | Discharge: HOME | End: 2021-05-03
Payer: MEDICARE

## 2021-05-03 VITALS
DIASTOLIC BLOOD PRESSURE: 91 MMHG | SYSTOLIC BLOOD PRESSURE: 151 MMHG | OXYGEN SATURATION: 98 % | HEART RATE: 88 BPM | RESPIRATION RATE: 18 BRPM | TEMPERATURE: 98 F | HEIGHT: 71 IN

## 2021-05-03 DIAGNOSIS — Z98.890 OTHER SPECIFIED POSTPROCEDURAL STATES: Chronic | ICD-10-CM

## 2021-05-03 DIAGNOSIS — M43.22 FUSION OF SPINE, CERVICAL REGION: Chronic | ICD-10-CM

## 2021-05-03 DIAGNOSIS — Z90.89 ACQUIRED ABSENCE OF OTHER ORGANS: Chronic | ICD-10-CM

## 2021-05-03 DIAGNOSIS — Z98.1 ARTHRODESIS STATUS: Chronic | ICD-10-CM

## 2021-05-03 LAB — SARS-COV-2 RNA SPEC QL NAA+PROBE: SIGNIFICANT CHANGE UP

## 2021-05-03 PROCEDURE — U0003: CPT

## 2021-05-03 PROCEDURE — 99282 EMERGENCY DEPT VISIT SF MDM: CPT | Mod: CS

## 2021-05-03 PROCEDURE — 99283 EMERGENCY DEPT VISIT LOW MDM: CPT

## 2021-05-03 PROCEDURE — U0005: CPT

## 2021-05-03 NOTE — ED PROVIDER NOTE - NS ED ROS FT
Denies fever, chills, fatigue,  Denies HA, Dizziness.   ENMT: Denies URI symptoms, difficulty swallowing, sore throat, loss of taste or smell.   CARDIO: Denies CP, palpitations,   RESP: Denies Cough, Diff breathing,   GI: Denies N/V, ABD pain, change in bowel movement..   MS: Denies joint pain, back pain, weakness,

## 2021-05-03 NOTE — ED PROVIDER NOTE - CLINICAL SUMMARY MEDICAL DECISION MAKING FREE TEXT BOX
patient requesting covid testing with symptomatic family member at home, vitals stable nontoxic appearing covid swabs obtained

## 2021-05-03 NOTE — ED PROVIDER NOTE - PATIENT PORTAL LINK FT
You can access the FollowMyHealth Patient Portal offered by [facility] by registering at the following website: [email or web address]. By joining [employer]’s FollowMyHealth portal, you will also be able to view your health information using other applications (apps) compatible with our system.

## 2021-05-03 NOTE — ED ADULT TRIAGE NOTE - CHIEF COMPLAINT QUOTE
Patient arrived to ED today with need for COVID-19 swab.  Patient reports that him and his wife were told to get swabbed due to her cough.

## 2021-05-03 NOTE — ED PROVIDER NOTE - OBJECTIVE STATEMENT
Pt is presenting to the ER for covid testing reporting wife with allergy symptoms. Denies fevers chills, loss of taste or smell, denies URI symptoms, denies chest pain or shortness of breath, denies nausea vomiting diarrhea or abdominal pain, and denies weakness or fatigue. Pt requesting testing at this time.

## 2021-05-05 DIAGNOSIS — T78.40XA ALLERGY, UNSPECIFIED, INITIAL ENCOUNTER: ICD-10-CM

## 2021-05-05 DIAGNOSIS — Z20.822 CONTACT WITH AND (SUSPECTED) EXPOSURE TO COVID-19: ICD-10-CM

## 2021-05-05 DIAGNOSIS — Z88.0 ALLERGY STATUS TO PENICILLIN: ICD-10-CM

## 2021-08-18 ENCOUNTER — NON-APPOINTMENT (OUTPATIENT)
Age: 61
End: 2021-08-18

## 2021-08-19 ENCOUNTER — APPOINTMENT (OUTPATIENT)
Dept: PULMONOLOGY | Facility: CLINIC | Age: 61
End: 2021-08-19
Payer: MEDICARE

## 2021-08-19 VITALS
DIASTOLIC BLOOD PRESSURE: 80 MMHG | OXYGEN SATURATION: 98 % | SYSTOLIC BLOOD PRESSURE: 140 MMHG | RESPIRATION RATE: 16 BRPM | HEART RATE: 76 BPM | BODY MASS INDEX: 29.57 KG/M2 | WEIGHT: 218 LBS

## 2021-08-19 DIAGNOSIS — J84.10 PULMONARY FIBROSIS, UNSPECIFIED: ICD-10-CM

## 2021-08-19 DIAGNOSIS — R61 GENERALIZED HYPERHIDROSIS: ICD-10-CM

## 2021-08-19 DIAGNOSIS — Z87.891 PERSONAL HISTORY OF NICOTINE DEPENDENCE: ICD-10-CM

## 2021-08-19 PROCEDURE — 99215 OFFICE O/P EST HI 40 MIN: CPT

## 2021-08-19 NOTE — DISCUSSION/SUMMARY
[FreeTextEntry1] :  ? Granuloma RUL, no suspicious nodules, few emphysema cyst\par PFTs are normal\par Chronic  dyspnea, worse at rest not exertion\par Lung exam is normal, normal sp02\par bothered by any fumes, prn rescue  inhaler and technique reviewed\par Has been having chronic night sweats x 2 yrs\par No evidence of active pulmonary disease, no adenopathy or splenomegaly\par Not anemic, followed by hematology for ITP\par Will check quantiferon TB Gold, D dimer- suspicion low\par Check blood parasites suspicion low\par Cardiology Dr Villarreal\par 6 months or sooner if needed\par

## 2021-08-19 NOTE — PROCEDURE
[FreeTextEntry1] : CT chest abdomen pelvis 8/21\par ? granuloma RUL , few emphysematous blebs, no nodules

## 2021-08-19 NOTE — PHYSICAL EXAM
[No Acute Distress] : no acute distress [Normal Rate/Rhythm] : normal rate/rhythm [Normal S1, S2] : normal s1, s2 [No Murmurs] : no murmurs [No Rubs] : no rubs [No Gallops] : no gallops [No Resp Distress] : no resp distress [No Acc Muscle Use] : no acc muscle use [Normal Rhythm and Effort] : normal rhythm and effort [Clear to Auscultation Bilaterally] : clear to auscultation bilaterally [Normal to Percussion] : normal to percussion [No Abnormalities] : no abnormalities [Normal Gait] : normal gait [No Clubbing] : no clubbing [No Cyanosis] : no cyanosis [No Edema] : no edema [FROM] : FROM [Normal Color/ Pigmentation] : normal color/ pigmentation [No Focal Deficits] : no focal deficits [Oriented x3] : oriented x3 [Normal Affect] : normal affect

## 2021-08-19 NOTE — HISTORY OF PRESENT ILLNESS
[TextBox_4] : doing well\par uses oral appliance ( AHI 12)\par chronic night sweats x 2 yrs\par no fever, chills\par no dyspnea\par Pfizer vaccine x 2\par chronic BEASLEY

## 2021-08-19 NOTE — CONSULT LETTER
[Dear  ___] : Dear  [unfilled], [Consult Letter:] : I had the pleasure of evaluating your patient, [unfilled]. [Please see my note below.] : Please see my note below. [Sincerely,] : Sincerely, [DrValentine  ___] : Dr. DAVILA [FreeTextEntry3] : Ge[facility]anny Dorsey DO [facility]\par Pulmonary Critical Care\par Director Pulmonary Division\par Medical Director Respiratory Therapy\par [facility]\par \par

## 2021-08-20 RX ORDER — ALBUTEROL SULFATE 2.5 MG/3ML
(2.5 MG/3ML) SOLUTION RESPIRATORY (INHALATION)
Qty: 1 | Refills: 3 | Status: DISCONTINUED | COMMUNITY
Start: 2021-08-19 | End: 2021-08-20

## 2021-08-23 RX ORDER — LEVALBUTEROL 1.25 MG/.5ML
1.25 SOLUTION, CONCENTRATE RESPIRATORY (INHALATION) 4 TIMES DAILY
Qty: 360 | Refills: 3 | Status: ACTIVE | COMMUNITY
Start: 2021-08-20 | End: 1900-01-01

## 2022-02-24 ENCOUNTER — APPOINTMENT (OUTPATIENT)
Dept: PULMONOLOGY | Facility: CLINIC | Age: 62
End: 2022-02-24

## 2022-03-29 ENCOUNTER — APPOINTMENT (OUTPATIENT)
Dept: DERMATOLOGY | Facility: CLINIC | Age: 62
End: 2022-03-29

## 2022-03-30 ENCOUNTER — APPOINTMENT (OUTPATIENT)
Dept: DERMATOLOGY | Facility: CLINIC | Age: 62
End: 2022-03-30
Payer: MEDICARE

## 2022-03-30 PROCEDURE — 99214 OFFICE O/P EST MOD 30 MIN: CPT

## 2022-04-12 NOTE — PHYSICAL THERAPY INITIAL EVALUATION ADULT - REHAB POTENTIAL, PT EVAL
The patient is seen and examined the history labs and imaging are reviewed. I agree with the resident note unless otherwise noted.    GENERAL: NAD, lying in bed comfortably,  HEAD:  Atraumatic, Normocephalic  EYES: conjunctiva and sclera clear  neck no jvp  CHEST/LUNG: Clear to auscultation bilaterally, no wheezing noted  HEART: Regular rate and rhythm; No murmurs, rubs, or gallops  ABDOMEN: BSx4; Soft, nontender, nondistended  NERVOUS SYSTEM: A&Ox2   EXTREMITIES:  no LE edema, Motor LE Rt3/5, Lt 4/5  #Unwitnessed Fall   #Hx of recurrent falls   - pt unable to recall events   - son reports generalized fatigue and weakness  - Trop 0.05, f/u Repeat Trop no chest pain   - CT Head no acute intracranial hemorrhage, stable exam since CT in 11/2021. Stable ischemic changes in right cerebellar hemisphere with mild cortical/cerebellar atrophy  - orthostatic, echo and PT eval   - neuro eval for LE weakness  #New-onset Afib  - EKG shows Afib, none noted on previous EKG  - CHADVASC 5, but given recurrent falls, may need to defer A/C  #HTN c/w home meds   #DM  - hold oral medications  - monitor FS, if FS>180 start basal/bolus   - check A1c  #Dementia  - continue with sertraline   #Hx of L MCA aneurysm   - never followed up as outpatient     ID [id number] good, to achieve stated therapy goals

## 2022-05-24 ENCOUNTER — APPOINTMENT (OUTPATIENT)
Dept: DERMATOLOGY | Facility: CLINIC | Age: 62
End: 2022-05-24
Payer: MEDICARE

## 2022-05-24 PROCEDURE — 99213 OFFICE O/P EST LOW 20 MIN: CPT

## 2022-10-27 NOTE — ED ADULT NURSE NOTE - NS_NURSE_DISC_TEACHING_YN_ED_ALL_ED
- Per nephro, plan for Peritoneal Dialysis 1 exchange tonight and 4 exchanges tomorrow using 2.5 % dextrose dialysate  - Renal diet, fluid restriction 1.2L/day  - Dose meds renally  - Avoid nephrotoxins.  - Will continue home sevelamer once patient no longer NPO
Yes

## 2022-11-22 NOTE — H&P PST ADULT - ASSESSMENT
minutes on the discharge service. 59 y/o male with cervical spinal stenosis. Pt reports history of cervical fusion in 2012. Complain of chronic neck pain. Takes oxycodone with mild pain relief. Pt stated "I had the shots and the ablation too but it did not relieve the pain." Scheduled for C6-7 cervical diskectomy fusion, instrumentation, allograft.     Plan  1. Stop all NSAIDS, herbal supplements and vitamins for 7 days.  2. NPO at midnight.  3. Take the following medications ( ranitidine, nefazodone ) with small sips of water on the morning of your procedure/surgery.  4. Use EZ sponges as directed  5. Use mupirocin as directed  CAPRINI SCORE [CLOT]    AGE RELATED RISK FACTORS                                                       MOBILITY RELATED FACTORS  [x ] Age 41-60 years                                            (1 Point)                  [ ] Bed rest                                                        (1 Point)  [ ] Age: 61-74 years                                           (2 Points)                 [ ] Plaster cast                                                   (2 Points)  [ ] Age= 75 years                                              (3 Points)                 [ ] Bed bound for more than 72 hours                 (2 Points)    DISEASE RELATED RISK FACTORS                                               GENDER SPECIFIC FACTORS  [ ] Edema in the lower extremities                       (1 Point)                  [ ] Pregnancy                                                     (1 Point)  [ ] Varicose veins                                               (1 Point)                  [ ] Post-partum < 6 weeks                                   (1 Point)             [x ] BMI > 25 Kg/m2                                            (1 Point)                  [ ] Hormonal therapy  or oral contraception          (1 Point)                 [ ] Sepsis (in the previous month)                        (1 Point)                  [ ] History of pregnancy complications                 (1 point)  [ ] Pneumonia or serious lung disease                                               [ ] Unexplained or recurrent                     (1 Point)           (in the previous month)                               (1 Point)  [ ] Abnormal pulmonary function test                     (1 Point)                 SURGERY RELATED RISK FACTORS  [ ] Acute myocardial infarction                              (1 Point)                 [ ]  Section                                             (1 Point)  [ ] Congestive heart failure (in the previous month)  (1 Point)               [ ] Minor surgery                                                  (1 Point)   [ ] Inflammatory bowel disease                             (1 Point)                 [ ] Arthroscopic surgery                                        (2 Points)  [ ] Central venous access                                      (2 Points)                [ x] General surgery lasting more than 45 minutes   (2 Points)       [ ] Stroke (in the previous month)                          (5 Points)               [ ] Elective arthroplasty                                         (5 Points)     ()  malignancy                                                             (2 points )                                                                                                                                      HEMATOLOGY RELATED FACTORS                                                 TRAUMA RELATED RISK FACTORS  [ ] Prior episodes of VTE                                     (3 Points)                 [ ] Fracture of the hip, pelvis, or leg                       (5 Points)  [ ] Positive family history for VTE                         (3 Points)                 [ ] Acute spinal cord injury (in the previous month)  (5 Points)  [ ] Prothrombin 29790 A                                     (3 Points)                 [ ] Paralysis  (less than 1 month)                             (5 Points)  [ ] Factor V Leiden                                             (3 Points)                  [ ] Multiple Trauma within 1 month                        (5 Points)  [ ] Lupus anticoagulants                                     (3 Points)                                                           [ ] Anticardiolipin antibodies                               (3 Points)                                                       [ ] High homocysteine in the blood                      (3 Points)                                             [ ] Other congenital or acquired thrombophilia      (3 Points)                                                [ ] Heparin induced thrombocytopenia                  (3 Points)                                          Total Score [     4     ]

## 2023-01-20 ENCOUNTER — APPOINTMENT (OUTPATIENT)
Dept: PULMONOLOGY | Facility: CLINIC | Age: 63
End: 2023-01-20
Payer: MEDICARE

## 2023-01-20 VITALS — WEIGHT: 225 LBS | HEIGHT: 71 IN | BODY MASS INDEX: 31.5 KG/M2

## 2023-01-20 VITALS
RESPIRATION RATE: 16 BRPM | DIASTOLIC BLOOD PRESSURE: 80 MMHG | HEART RATE: 90 BPM | SYSTOLIC BLOOD PRESSURE: 140 MMHG | OXYGEN SATURATION: 98 %

## 2023-01-20 PROCEDURE — 99214 OFFICE O/P EST MOD 30 MIN: CPT

## 2023-01-20 RX ORDER — DILTIAZEM HYDROCHLORIDE 120 MG/1
120 CAPSULE, EXTENDED RELEASE ORAL
Refills: 0 | Status: DISCONTINUED | COMMUNITY
End: 2023-01-20

## 2023-01-20 RX ORDER — VORTIOXETINE 5 MG/1
5 TABLET, FILM COATED ORAL
Refills: 0 | Status: DISCONTINUED | COMMUNITY
End: 2023-01-20

## 2023-01-20 RX ORDER — VENLAFAXINE 37.5 MG/1
TABLET ORAL
Refills: 0 | Status: ACTIVE | COMMUNITY

## 2023-01-20 RX ORDER — NEFAZODONE HCL 150 MG
150 TABLET ORAL
Refills: 0 | Status: DISCONTINUED | COMMUNITY
End: 2023-01-20

## 2023-01-20 RX ORDER — ALBUTEROL SULFATE 90 UG/1
108 (90 BASE) INHALANT RESPIRATORY (INHALATION)
Qty: 1 | Refills: 5 | Status: ACTIVE | COMMUNITY
Start: 2023-01-20 | End: 1900-01-01

## 2023-01-20 RX ORDER — OXYCODONE AND ACETAMINOPHEN 5; 325 MG/1; MG/1
5-325 TABLET ORAL
Refills: 0 | Status: DISCONTINUED | COMMUNITY
End: 2023-01-20

## 2023-01-20 RX ORDER — DILTIAZEM HYDROCHLORIDE 180 MG/1
180 CAPSULE, EXTENDED RELEASE ORAL
Refills: 0 | Status: ACTIVE | COMMUNITY

## 2023-01-20 RX ORDER — TIZANIDINE HYDROCHLORIDE 4 MG/1
4 CAPSULE ORAL
Refills: 0 | Status: DISCONTINUED | COMMUNITY
End: 2023-01-20

## 2023-01-20 RX ORDER — LITHIUM CARBONATE 300 MG/1
TABLET ORAL
Refills: 0 | Status: ACTIVE | COMMUNITY

## 2023-01-20 NOTE — HISTORY OF PRESENT ILLNESS
[TextBox_4] : doing well\par uses oral appliance ( AHI 12)\par no acute pulmonary complaints\par no fever, chills, chest pain\par now on Testosterone injections

## 2023-01-20 NOTE — PROCEDURE
[FreeTextEntry1] : CT chest abdomen pelvis 8/21\par ? granuloma RUL , few emphysematous blebs, no nodules\par quantiferon negative

## 2023-01-20 NOTE — DISCUSSION/SUMMARY
[FreeTextEntry1] :  ? Granuloma RUL, no suspicious nodules, few emphysema cyst, Quantiferon negative\par PFTs are normal, quit smoking 20 yrs ago\par No acute pulmonary complaints\par Lung exam is normal, normal sp02\par Not anemic, followed by hematology for ITP\par Cardiology Dr Villarreal\par Will obtain CXR, repeat spirometry at follow up\par prn rescue inhaler\par 6 months or sooner if needed\par

## 2023-01-20 NOTE — CONSULT LETTER
[Dear  ___] : Dear  [unfilled], [Consult Letter:] : I had the pleasure of evaluating your patient, [unfilled]. [Please see my note below.] : Please see my note below. [Sincerely,] : Sincerely, [DrValentine  ___] : Dr. DAVILA [FreeTextEntry3] : Geovanny Dorsey DO [facility]\par Pulmonary Critical Care\par Director Pulmonary Division\par Medical Director Respiratory Therapy\par [facility]\par \par

## 2023-03-09 ENCOUNTER — APPOINTMENT (OUTPATIENT)
Dept: DERMATOLOGY | Facility: CLINIC | Age: 63
End: 2023-03-09
Payer: MEDICARE

## 2023-03-09 PROCEDURE — 99213 OFFICE O/P EST LOW 20 MIN: CPT | Mod: 25

## 2023-03-09 PROCEDURE — 17000 DESTRUCT PREMALG LESION: CPT

## 2023-05-18 ENCOUNTER — NON-APPOINTMENT (OUTPATIENT)
Age: 63
End: 2023-05-18

## 2023-06-19 ENCOUNTER — NON-APPOINTMENT (OUTPATIENT)
Age: 63
End: 2023-06-19

## 2023-06-20 ENCOUNTER — APPOINTMENT (OUTPATIENT)
Dept: DERMATOLOGY | Facility: CLINIC | Age: 63
End: 2023-06-20
Payer: MEDICARE

## 2023-06-20 PROCEDURE — 17110 DESTRUCTION B9 LES UP TO 14: CPT

## 2023-06-20 PROCEDURE — 99214 OFFICE O/P EST MOD 30 MIN: CPT | Mod: 25

## 2023-07-14 NOTE — ED STATDOCS - INTERNATIONAL TRAVEL
Today's date 7/14/2023  Admission date 7/11/2023  Hospital Room /A   Referring Provider Tonya Eckert NP    Reason for initial visit:  Evaluation and management of Diabetes Mellitus - type II with hyperglycemia    History of Present Illness:  Current history is provided by The patient and The chart  This is a 61 year old Female with PMH notable for type 2 diabetes mellitus, aortic stenosis, HTN, HLD, CAD and other medical conditions who is admitted for surgical management of her aortic valve disease and CAD. Patient is now s/p aortic valve replacement and CABG on 7/11/2023 (no intra-op dex given).    Interval History/ROS:  -  BG currently at goal on insulin gtt, only had minimal BG spikes yesterday  - overnight, insulin gtt ran 0 - 1.2 units/hr  - appetite has been variable, had 2 meals only yesterday  - no complaints today, denies n/v, cp or sob    Current hospital regimen for diabetes: insulin gtt, Lantus 16 units qhs, and humalog 8 units pc    Current oral intake status: Consistent Carb Moderate (45-75 Gm/Meal) Diet  Room Service Continuous     Blood sugars while hospitalized:   Recent Labs   Lab 07/13/23  1159 07/13/23  1357 07/13/23  1411 07/13/23  1517 07/13/23  1605 07/13/23  1759 07/13/23  1956 07/13/23  2048 07/13/23  2154 07/13/23  2256 07/14/23  0020 07/14/23  0155 07/14/23  0353 07/14/23  0603 07/14/23  0750   GLUCOSE BEDSIDE 104* 216* 252* 203* 174* 89 240* 189* 167* 144* 133* 135* 125* 122* 98     Patient has type 2 diabetes mellitus diagnosed on 199. Patient's home regimen includes mixed insulin 70/30 at 55 units with breakfast and 25 units with dinner, metformin 1000 mg BID and Jardiance 25 mg daily. Of note, she used to be on basal-bolus insulin, but switched to mixed insulin due to hypoglycemia. Also, she cannot use use GLP1 due to previous history of pancreatitis. Patient has Marge to monitor BG at home with variable values (no recent sensor use prior to admission to monitor BG values.  She reports rare hypoglycemic episodes at home. Patient does have good hypoglycemic awareness.     Patient Has not been hospitalized or utilized emergency services for blood sugars in the recent past. Patient sees Dr. Cintron at [facility] for diabetes management as an outpatient. She was most recently seen in that clinic on 7/10/2023.    Patient's diabetes complications include:   Retinopathy - denies. Last dilated eye exam was on 3/2023.  Nephropathy- none  Neuropathy- denies, also no history of ulcers and/or amputations to the lower extremities.    History:   Past medical history, surgical history, medications, allergies, family history and social history reviewed and in Epic.    Physical Exam:  Visit Vitals  /56 (BP Location: RUE - Right upper extremity, Patient Position: Semi-Wilson's)   Pulse 89   Temp 98.4 °F (36.9 °C) (Oral)   Resp 18   Ht 5' 4\" (1.626 m)   Wt 77 kg (169 lb 12.1 oz)   SpO2 92%   BMI 29.14 kg/m²   Constitutional: Sitting in chair, well nourished female and in NAD.  Eyes: Anicteric and conjunctivae not pale. No exophthalmos.   Head: Normocephalic, atraumatic  Neck: Supple with normal ROM. No overt thyromegaly.   Respiratory: non-labored. No cyanosis. Surgical incision c/d/i  GI: Non-distended abdomen  Skin: Warm and dry, no visible rashes.    Psych: Normal mood and affect.  Musculoskeletal: CEDEÑO x4, no deformities  Neuro: Awake, alert. EOMI and no tremor. Follows commands. Speech normal.     Lab Review:  Hemoglobin A1C (%)   Date Value   06/27/2023 7.6 (H)     No components found for: \"CHOLHDL\"  Triglycerides (mg/dL)   Date Value   05/12/2023 220 (H)     HDL (mg/dL)   Date Value   05/12/2023 32 (L)     LDL (mg/dL)   Date Value   05/12/2023 50     No components found for: \"NONHDLCALC\"  Sodium (mmol/L)   Date Value   07/13/2023 136     Potassium (mmol/L)   Date Value   07/13/2023 4.3     Chloride (mmol/L)   Date Value   07/13/2023 107     Glucose (mg/dL)   Date Value   07/13/2023 108 (H)      Calcium (mg/dL)   Date Value   07/13/2023 8.6     Carbon Dioxide (mmol/L)   Date Value   07/13/2023 22     BUN (mg/dL)   Date Value   07/13/2023 21 (H)     Creatinine (mg/dL)   Date Value   07/13/2023 0.65     TSH (mcUnits/mL)   Date Value   06/27/2023 1.679     WBC (K/mcL)   Date Value   07/13/2023 13.4 (H)     RBC (mil/mcL)   Date Value   07/13/2023 3.06 (L)     HCT (%)   Date Value   07/13/2023 27.4 (L)     HGB (g/dL)   Date Value   07/13/2023 9.0 (L)     PLT (K/mcL)   Date Value   07/13/2023 243     GOT/AST (Units/L)   Date Value   06/27/2023 21     GPT/ALT (Units/L)   Date Value   06/27/2023 26     No results found for: \"GGTP\"  Alkaline Phosphatase (Units/L)   Date Value   06/27/2023 64     Bilirubin, Total (mg/dL)   Date Value   06/27/2023 0.3       Assessment:  Type 2 diabetes with hyperglycemia  CAD s/p CABG on on 7/11/2023 (no intra-op dex given).  s/p aortic valve replacement   Hypothyroidism, primary team managing with PTA levothyroxine regimen (most recent TSH at normal)    Plan:  Patient has had minimal needs on insulin gtt overnight with good control. Po intake has been inconsistent due to poor appetite.    -- Stop insulin gtt   -- Continue [id number] units qhs (increased to 20 units later per review of BG trend)  -- Change Humalog to 6 units ac plus low correction (later changed to MED scale)  -- Check BG AC + HS and prn  -- CHO restricted diet  -- Hypoglycemia protocol prn    Anticipated discharge date: 7/15/2023?  Plan for discharge is insulin, doses yet to be finalized since insulin gtt was just stopped today. Of note, patient on much higher mixed insulin regimen + oral at home.    We will continue to follow and titrate/manage medications as appropriate.   Tomi Lopes MD   No

## 2023-07-17 ENCOUNTER — APPOINTMENT (OUTPATIENT)
Dept: PULMONOLOGY | Facility: CLINIC | Age: 63
End: 2023-07-17
Payer: MEDICARE

## 2023-07-17 VITALS — HEIGHT: 71 IN | BODY MASS INDEX: 28.28 KG/M2 | WEIGHT: 202 LBS

## 2023-07-17 VITALS
DIASTOLIC BLOOD PRESSURE: 62 MMHG | SYSTOLIC BLOOD PRESSURE: 104 MMHG | RESPIRATION RATE: 16 BRPM | HEART RATE: 78 BPM | OXYGEN SATURATION: 98 %

## 2023-07-17 DIAGNOSIS — G47.33 OBSTRUCTIVE SLEEP APNEA (ADULT) (PEDIATRIC): ICD-10-CM

## 2023-07-17 DIAGNOSIS — J44.9 CHRONIC OBSTRUCTIVE PULMONARY DISEASE, UNSPECIFIED: ICD-10-CM

## 2023-07-17 PROCEDURE — 99214 OFFICE O/P EST MOD 30 MIN: CPT | Mod: 25

## 2023-07-17 PROCEDURE — 94010 BREATHING CAPACITY TEST: CPT

## 2023-07-17 NOTE — CONSULT LETTER
[Dear  ___] : Dear  [unfilled], [Consult Letter:] : I had the pleasure of evaluating your patient, [unfilled]. [Please see my note below.] : Please see my note below. [Sincerely,] : Sincerely, [DrValentine  ___] : Dr. DAVILA [FreeTextEntry3] : [facility]\par Pulmonary Critical Care\par Director Pulmonary Division\par Medical Director Respiratory Therapy\par [facility]\par \par

## 2023-07-17 NOTE — DISCUSSION/SUMMARY
[FreeTextEntry1] :  ? Granuloma RUL, no suspicious nodules, few emphysema cyst, Quantiferon negative\par PFTs are normal, quit smoking 20 yrs ago\par No acute pulmonary complaints\par Lung exam is normal, normal sp02\par Not anemic, followed by hematology for ITP, stable\par Cardiology Dr Villarreal\par CXR 5/23 negative\par prn rescue inhaler\par 6 months or sooner if needed\par

## 2023-07-17 NOTE — HISTORY OF PRESENT ILLNESS
[Former] : former [TextBox_4] : doing well\par uses oral appliance ( AHI 12)\par no acute pulmonary complaints\par no fever, chills, chest pain\par now on Testosterone injections\par recent CXR negative [YearQuit] : 2001

## 2023-07-17 NOTE — PROCEDURE
[FreeTextEntry1] : CT chest abdomen pelvis 8/21\par ? granuloma RUL , few emphysematous blebs, no nodules\par quantiferon negative\par \par CXR 5/23 negative\par spirometry is normal today, no change from 2021

## 2023-11-16 ENCOUNTER — OUTPATIENT (OUTPATIENT)
Dept: OUTPATIENT SERVICES | Facility: HOSPITAL | Age: 63
LOS: 1 days | End: 2023-11-16
Payer: MEDICARE

## 2023-11-16 VITALS
HEIGHT: 71 IN | DIASTOLIC BLOOD PRESSURE: 84 MMHG | SYSTOLIC BLOOD PRESSURE: 133 MMHG | OXYGEN SATURATION: 99 % | TEMPERATURE: 98 F | WEIGHT: 190.04 LBS | HEART RATE: 68 BPM | RESPIRATION RATE: 16 BRPM

## 2023-11-16 DIAGNOSIS — J34.2 DEVIATED NASAL SEPTUM: ICD-10-CM

## 2023-11-16 DIAGNOSIS — Z98.890 OTHER SPECIFIED POSTPROCEDURAL STATES: Chronic | ICD-10-CM

## 2023-11-16 DIAGNOSIS — Z98.1 ARTHRODESIS STATUS: Chronic | ICD-10-CM

## 2023-11-16 DIAGNOSIS — J34.3 HYPERTROPHY OF NASAL TURBINATES: ICD-10-CM

## 2023-11-16 DIAGNOSIS — F41.9 ANXIETY DISORDER, UNSPECIFIED: ICD-10-CM

## 2023-11-16 DIAGNOSIS — I10 ESSENTIAL (PRIMARY) HYPERTENSION: ICD-10-CM

## 2023-11-16 DIAGNOSIS — M43.22 FUSION OF SPINE, CERVICAL REGION: Chronic | ICD-10-CM

## 2023-11-16 DIAGNOSIS — G47.33 OBSTRUCTIVE SLEEP APNEA (ADULT) (PEDIATRIC): ICD-10-CM

## 2023-11-16 DIAGNOSIS — Z90.89 ACQUIRED ABSENCE OF OTHER ORGANS: Chronic | ICD-10-CM

## 2023-11-16 DIAGNOSIS — Z01.818 ENCOUNTER FOR OTHER PREPROCEDURAL EXAMINATION: ICD-10-CM

## 2023-11-16 DIAGNOSIS — D10.0 BENIGN NEOPLASM OF LIP: ICD-10-CM

## 2023-11-16 DIAGNOSIS — H40.9 UNSPECIFIED GLAUCOMA: ICD-10-CM

## 2023-11-16 LAB
ALBUMIN SERPL ELPH-MCNC: 3.8 G/DL — SIGNIFICANT CHANGE UP (ref 3.3–5)
ALBUMIN SERPL ELPH-MCNC: 3.8 G/DL — SIGNIFICANT CHANGE UP (ref 3.3–5)
ALP SERPL-CCNC: 59 U/L — SIGNIFICANT CHANGE UP (ref 40–120)
ALP SERPL-CCNC: 59 U/L — SIGNIFICANT CHANGE UP (ref 40–120)
ALT FLD-CCNC: 27 U/L — SIGNIFICANT CHANGE UP (ref 12–78)
ALT FLD-CCNC: 27 U/L — SIGNIFICANT CHANGE UP (ref 12–78)
ANION GAP SERPL CALC-SCNC: 4 MMOL/L — LOW (ref 5–17)
ANION GAP SERPL CALC-SCNC: 4 MMOL/L — LOW (ref 5–17)
AST SERPL-CCNC: 13 U/L — LOW (ref 15–37)
AST SERPL-CCNC: 13 U/L — LOW (ref 15–37)
BILIRUB SERPL-MCNC: 0.6 MG/DL — SIGNIFICANT CHANGE UP (ref 0.2–1.2)
BILIRUB SERPL-MCNC: 0.6 MG/DL — SIGNIFICANT CHANGE UP (ref 0.2–1.2)
BUN SERPL-MCNC: 19 MG/DL — SIGNIFICANT CHANGE UP (ref 7–23)
BUN SERPL-MCNC: 19 MG/DL — SIGNIFICANT CHANGE UP (ref 7–23)
CALCIUM SERPL-MCNC: 9.2 MG/DL — SIGNIFICANT CHANGE UP (ref 8.5–10.1)
CALCIUM SERPL-MCNC: 9.2 MG/DL — SIGNIFICANT CHANGE UP (ref 8.5–10.1)
CHLORIDE SERPL-SCNC: 105 MMOL/L — SIGNIFICANT CHANGE UP (ref 96–108)
CHLORIDE SERPL-SCNC: 105 MMOL/L — SIGNIFICANT CHANGE UP (ref 96–108)
CO2 SERPL-SCNC: 33 MMOL/L — HIGH (ref 22–31)
CO2 SERPL-SCNC: 33 MMOL/L — HIGH (ref 22–31)
CREAT SERPL-MCNC: 0.99 MG/DL — SIGNIFICANT CHANGE UP (ref 0.5–1.3)
CREAT SERPL-MCNC: 0.99 MG/DL — SIGNIFICANT CHANGE UP (ref 0.5–1.3)
EGFR: 85 ML/MIN/1.73M2 — SIGNIFICANT CHANGE UP
EGFR: 85 ML/MIN/1.73M2 — SIGNIFICANT CHANGE UP
GLUCOSE SERPL-MCNC: 122 MG/DL — HIGH (ref 70–99)
GLUCOSE SERPL-MCNC: 122 MG/DL — HIGH (ref 70–99)
HCT VFR BLD CALC: 48.8 % — SIGNIFICANT CHANGE UP (ref 39–50)
HCT VFR BLD CALC: 48.8 % — SIGNIFICANT CHANGE UP (ref 39–50)
HGB BLD-MCNC: 16.1 G/DL — SIGNIFICANT CHANGE UP (ref 13–17)
HGB BLD-MCNC: 16.1 G/DL — SIGNIFICANT CHANGE UP (ref 13–17)
MCHC RBC-ENTMCNC: 29.8 PG — SIGNIFICANT CHANGE UP (ref 27–34)
MCHC RBC-ENTMCNC: 29.8 PG — SIGNIFICANT CHANGE UP (ref 27–34)
MCHC RBC-ENTMCNC: 33 GM/DL — SIGNIFICANT CHANGE UP (ref 32–36)
MCHC RBC-ENTMCNC: 33 GM/DL — SIGNIFICANT CHANGE UP (ref 32–36)
MCV RBC AUTO: 90.4 FL — SIGNIFICANT CHANGE UP (ref 80–100)
MCV RBC AUTO: 90.4 FL — SIGNIFICANT CHANGE UP (ref 80–100)
NRBC # BLD: 0 /100 WBCS — SIGNIFICANT CHANGE UP (ref 0–0)
NRBC # BLD: 0 /100 WBCS — SIGNIFICANT CHANGE UP (ref 0–0)
PLATELET # BLD AUTO: 159 K/UL — SIGNIFICANT CHANGE UP (ref 150–400)
PLATELET # BLD AUTO: 159 K/UL — SIGNIFICANT CHANGE UP (ref 150–400)
POTASSIUM SERPL-MCNC: 4.5 MMOL/L — SIGNIFICANT CHANGE UP (ref 3.5–5.3)
POTASSIUM SERPL-MCNC: 4.5 MMOL/L — SIGNIFICANT CHANGE UP (ref 3.5–5.3)
POTASSIUM SERPL-SCNC: 4.5 MMOL/L — SIGNIFICANT CHANGE UP (ref 3.5–5.3)
POTASSIUM SERPL-SCNC: 4.5 MMOL/L — SIGNIFICANT CHANGE UP (ref 3.5–5.3)
PROT SERPL-MCNC: 6.9 G/DL — SIGNIFICANT CHANGE UP (ref 6–8.3)
PROT SERPL-MCNC: 6.9 G/DL — SIGNIFICANT CHANGE UP (ref 6–8.3)
RBC # BLD: 5.4 M/UL — SIGNIFICANT CHANGE UP (ref 4.2–5.8)
RBC # BLD: 5.4 M/UL — SIGNIFICANT CHANGE UP (ref 4.2–5.8)
RBC # FLD: 14.7 % — HIGH (ref 10.3–14.5)
RBC # FLD: 14.7 % — HIGH (ref 10.3–14.5)
SODIUM SERPL-SCNC: 142 MMOL/L — SIGNIFICANT CHANGE UP (ref 135–145)
SODIUM SERPL-SCNC: 142 MMOL/L — SIGNIFICANT CHANGE UP (ref 135–145)
WBC # BLD: 8.6 K/UL — SIGNIFICANT CHANGE UP (ref 3.8–10.5)
WBC # BLD: 8.6 K/UL — SIGNIFICANT CHANGE UP (ref 3.8–10.5)
WBC # FLD AUTO: 8.6 K/UL — SIGNIFICANT CHANGE UP (ref 3.8–10.5)
WBC # FLD AUTO: 8.6 K/UL — SIGNIFICANT CHANGE UP (ref 3.8–10.5)

## 2023-11-16 PROCEDURE — 93005 ELECTROCARDIOGRAM TRACING: CPT

## 2023-11-16 PROCEDURE — G0463: CPT

## 2023-11-16 PROCEDURE — 36415 COLL VENOUS BLD VENIPUNCTURE: CPT

## 2023-11-16 PROCEDURE — 93010 ELECTROCARDIOGRAM REPORT: CPT

## 2023-11-16 PROCEDURE — 85027 COMPLETE CBC AUTOMATED: CPT

## 2023-11-16 PROCEDURE — 80053 COMPREHEN METABOLIC PANEL: CPT

## 2023-11-16 RX ORDER — RANITIDINE HYDROCHLORIDE 150 MG/1
1 TABLET, FILM COATED ORAL
Qty: 0 | Refills: 0 | DISCHARGE

## 2023-11-16 RX ORDER — NEFAZODONE HYDROCHLORIDE 200 MG/1
1 TABLET ORAL
Qty: 0 | Refills: 0 | DISCHARGE

## 2023-11-16 NOTE — H&P PST ADULT - NSICDXPASTSURGICALHX_GEN_ALL_CORE_FT
PAST SURGICAL HISTORY:  Cervical vertebral fusion 05/2012    H/O arthroscopy of left knee 04/2017    H/O bilateral inguinal hernia repair 2015    History of arthroscopy of right shoulder rotator cuff repair 05/2018    History of lumbar spinal fusion 2014 TLIF    History of tonsillectomy     S/P ACL repair left knee

## 2023-11-16 NOTE — H&P PST ADULT - RESPIRATORY
normal/clear to auscultation bilaterally/no wheezes/no respiratory distress/no use of accessory muscles

## 2023-11-16 NOTE — H&P PST ADULT - ASSESSMENT
64 yo M scheduled for septoplasty, bilateral  submucous resection of turbinates-Excision lip lesion on 11/28/23

## 2023-11-16 NOTE — H&P PST ADULT - NSICDXFAMILYHX_GEN_ALL_CORE_FT
FAMILY HISTORY:  Father  Still living? Unknown  Family history of Alzheimer's disease, Age at diagnosis: Age Unknown    Mother  Still living? Unknown  Family history of colon cancer in mother, Age at diagnosis: Age Unknown    Sibling  Still living? Unknown  Family history of Alzheimer's disease, Age at diagnosis: Age Unknown  Family history of CLL (chronic lymphoid leukemia), Age at diagnosis: Age Unknown

## 2023-11-16 NOTE — H&P PST ADULT - HISTORY OF PRESENT ILLNESS
64 y/o with h/o HTN, CARLOS ALBERTO, Chronic ITP since 1990 ,depression & anxiety c/o chronic nasal congestion and left upper lip lesion x 2 years. Had ENT consult- CT sinuses revealed deviated nasal septum. Pt scheduled for septoplasty- bilateral submucous resection of turbinates, excision lip lesion on 11/28/23  **Denies any fever, chills, CP/SOB or sick contacts

## 2023-11-16 NOTE — H&P PST ADULT - NSICDXPASTMEDICALHX_GEN_ALL_CORE_FT
PAST MEDICAL HISTORY:  Anxiety     Benign essential HTN     Cataract     Chronic ITP (idiopathic thrombocytopenia)     Depression     Deviated nasal septum     Diverticulosis     GERD (gastroesophageal reflux disease)     Lip lesion     CARLOS ALBERTO (obstructive sleep apnea)     Spinal stenosis of cervical region

## 2023-11-16 NOTE — H&P PST ADULT - PROBLEM SELECTOR PLAN 1
septoplasty, bilateral  submucous resection of turbinates-Excision lip lesion on 11/28/23  Labs- CBC, CMP, EKG  Pre op instructions discussed, verbalized understanding  Pre op PCP & Hematology evaluation

## 2024-03-05 PROBLEM — I10 ESSENTIAL (PRIMARY) HYPERTENSION: Chronic | Status: ACTIVE | Noted: 2023-11-16

## 2024-03-08 ENCOUNTER — OUTPATIENT (OUTPATIENT)
Dept: OUTPATIENT SERVICES | Facility: HOSPITAL | Age: 64
LOS: 1 days | End: 2024-03-08
Payer: MEDICARE

## 2024-03-08 VITALS
WEIGHT: 201.94 LBS | OXYGEN SATURATION: 99 % | RESPIRATION RATE: 14 BRPM | HEIGHT: 71 IN | HEART RATE: 74 BPM | TEMPERATURE: 97 F | SYSTOLIC BLOOD PRESSURE: 140 MMHG | DIASTOLIC BLOOD PRESSURE: 79 MMHG

## 2024-03-08 DIAGNOSIS — J34.3 HYPERTROPHY OF NASAL TURBINATES: ICD-10-CM

## 2024-03-08 DIAGNOSIS — D10.0 BENIGN NEOPLASM OF LIP: ICD-10-CM

## 2024-03-08 DIAGNOSIS — Z98.890 OTHER SPECIFIED POSTPROCEDURAL STATES: Chronic | ICD-10-CM

## 2024-03-08 DIAGNOSIS — J34.2 DEVIATED NASAL SEPTUM: ICD-10-CM

## 2024-03-08 DIAGNOSIS — M43.22 FUSION OF SPINE, CERVICAL REGION: Chronic | ICD-10-CM

## 2024-03-08 DIAGNOSIS — Z90.89 ACQUIRED ABSENCE OF OTHER ORGANS: Chronic | ICD-10-CM

## 2024-03-08 DIAGNOSIS — Z01.818 ENCOUNTER FOR OTHER PREPROCEDURAL EXAMINATION: ICD-10-CM

## 2024-03-08 DIAGNOSIS — Z98.1 ARTHRODESIS STATUS: Chronic | ICD-10-CM

## 2024-03-08 PROBLEM — K13.0 DISEASES OF LIPS: Chronic | Status: ACTIVE | Noted: 2023-11-16

## 2024-03-08 PROBLEM — G47.33 OBSTRUCTIVE SLEEP APNEA (ADULT) (PEDIATRIC): Chronic | Status: ACTIVE | Noted: 2023-11-16

## 2024-03-08 LAB
ALBUMIN SERPL ELPH-MCNC: 4.2 G/DL — SIGNIFICANT CHANGE UP (ref 3.3–5)
ALP SERPL-CCNC: 68 U/L — SIGNIFICANT CHANGE UP (ref 40–120)
ALT FLD-CCNC: 26 U/L — SIGNIFICANT CHANGE UP (ref 12–78)
ANION GAP SERPL CALC-SCNC: 4 MMOL/L — LOW (ref 5–17)
AST SERPL-CCNC: 20 U/L — SIGNIFICANT CHANGE UP (ref 15–37)
BILIRUB SERPL-MCNC: 0.5 MG/DL — SIGNIFICANT CHANGE UP (ref 0.2–1.2)
BUN SERPL-MCNC: 21 MG/DL — SIGNIFICANT CHANGE UP (ref 7–23)
CALCIUM SERPL-MCNC: 9.3 MG/DL — SIGNIFICANT CHANGE UP (ref 8.5–10.1)
CHLORIDE SERPL-SCNC: 108 MMOL/L — SIGNIFICANT CHANGE UP (ref 96–108)
CO2 SERPL-SCNC: 32 MMOL/L — HIGH (ref 22–31)
CREAT SERPL-MCNC: 1 MG/DL — SIGNIFICANT CHANGE UP (ref 0.5–1.3)
EGFR: 84 ML/MIN/1.73M2 — SIGNIFICANT CHANGE UP
GLUCOSE SERPL-MCNC: 113 MG/DL — HIGH (ref 70–99)
HCT VFR BLD CALC: 50.4 % — HIGH (ref 39–50)
HGB BLD-MCNC: 17.2 G/DL — HIGH (ref 13–17)
MCHC RBC-ENTMCNC: 29.7 PG — SIGNIFICANT CHANGE UP (ref 27–34)
MCHC RBC-ENTMCNC: 34.1 GM/DL — SIGNIFICANT CHANGE UP (ref 32–36)
MCV RBC AUTO: 86.9 FL — SIGNIFICANT CHANGE UP (ref 80–100)
NRBC # BLD: 0 /100 WBCS — SIGNIFICANT CHANGE UP (ref 0–0)
PLATELET # BLD AUTO: 121 K/UL — LOW (ref 150–400)
POTASSIUM SERPL-MCNC: 5 MMOL/L — SIGNIFICANT CHANGE UP (ref 3.5–5.3)
POTASSIUM SERPL-SCNC: 5 MMOL/L — SIGNIFICANT CHANGE UP (ref 3.5–5.3)
PROT SERPL-MCNC: 7 G/DL — SIGNIFICANT CHANGE UP (ref 6–8.3)
RBC # BLD: 5.8 M/UL — SIGNIFICANT CHANGE UP (ref 4.2–5.8)
RBC # FLD: 12.3 % — SIGNIFICANT CHANGE UP (ref 10.3–14.5)
SODIUM SERPL-SCNC: 144 MMOL/L — SIGNIFICANT CHANGE UP (ref 135–145)
WBC # BLD: 5.66 K/UL — SIGNIFICANT CHANGE UP (ref 3.8–10.5)
WBC # FLD AUTO: 5.66 K/UL — SIGNIFICANT CHANGE UP (ref 3.8–10.5)

## 2024-03-08 PROCEDURE — 80053 COMPREHEN METABOLIC PANEL: CPT

## 2024-03-08 PROCEDURE — 85027 COMPLETE CBC AUTOMATED: CPT

## 2024-03-08 PROCEDURE — 36415 COLL VENOUS BLD VENIPUNCTURE: CPT

## 2024-03-08 RX ORDER — LAMOTRIGINE 25 MG/1
1 TABLET, ORALLY DISINTEGRATING ORAL
Refills: 0 | DISCHARGE

## 2024-03-08 RX ORDER — VENLAFAXINE HCL 75 MG
1 CAPSULE, EXT RELEASE 24 HR ORAL
Refills: 0 | DISCHARGE

## 2024-03-08 RX ORDER — MAGNESIUM OXIDE 400 MG ORAL TABLET 241.3 MG
1 TABLET ORAL
Refills: 0 | DISCHARGE

## 2024-03-08 RX ORDER — DILTIAZEM HCL 120 MG
1 CAPSULE, EXT RELEASE 24 HR ORAL
Refills: 0 | DISCHARGE

## 2024-03-08 NOTE — H&P PST ADULT - NEGATIVE GENERAL SYMPTOMS
Does note prior H/o COVID X 2 - States he HAS HAD prior covid vaccine/no fever/no chills/no sweating

## 2024-03-08 NOTE — H&P PST ADULT - NS PRO PASSIVE SMOKE EXP
Pt is having depression and anxiety. Pt is having occasional SI, denies any plans or intention. Is to be starting ECT.  
No

## 2024-03-08 NOTE — H&P PST ADULT - PROBLEM SELECTOR PLAN 1
PST labs; CBC, CMP (EKG on chart from 11/16/2024). Appointment for medical clearance scheduled with PCP Dr Michael Calix on 3/13/2024. Appointment for Hematology Clearance scheduled with Dr Dory Corea on 3/14/2024. Will fax over PST results to PCP for review and clearances.   Pt instructed to stop any NSAIDS/Herbal Supplements one week prior to procedure. May take Tylenol if needed for any pain between now and procedure.   Morning of procedure he may take his Clonezapam, Venlafaxine and Lamotrigine with small sips of water. He may also take his Doezolamide-Timolol Eye Drops.  Pre-op instructions given to pt with understanding verbalized. All questions addressed with pt prior to him leaving the PST department today.

## 2024-03-08 NOTE — H&P PST ADULT - ASSESSMENT
63 year old male PMH Benign Essential HTN, Chronic ITP, CARLOS ALBERTO ( Wears Dental Mouthpiece), Bipolar Disorder, Glaucoma, Anxiety, Depression, GERD, Diverticulosis, Cataract, Spinal Stenosis Of Cervical Region, COVID-19; now with Benign Neoplasm Of Lip, Deviated Nasal Septum, Hypertrophy Of Nasal Turbinates; presents today for PST prior to Septoplasty - Bilateral  Submucous Resection Of Turbinates - Excision Of Benign Lip Lesion with Dr Ana Dumont on 3/20/2024.

## 2024-03-08 NOTE — H&P PST ADULT - ENMT COMMENTS
Denies hearing aids or dentures  - only notes permanent dental caps Deviated Nasal Septum Deviated Nasal Septum/Lip lesion

## 2024-03-08 NOTE — H&P PST ADULT - NSICDXPASTSURGICALHX_GEN_ALL_CORE_FT
PAST SURGICAL HISTORY:  Cervical vertebral fusion 05/2012    H/O arthroscopy of left knee 04/2017    H/O bilateral inguinal hernia repair 2015    H/O colonoscopy     H/O endoscopy     H/O facial fracture repair     History of arthroscopy of right shoulder rotator cuff repair 05/2018    History of lumbar spinal fusion 2014 TLIF    History of tonsillectomy     S/P ACL repair left knee

## 2024-03-08 NOTE — H&P PST ADULT - NSICDXPASTMEDICALHX_GEN_ALL_CORE_FT
PAST MEDICAL HISTORY:  2019 novel coronavirus disease (COVID-19)     Anxiety     Benign essential HTN     Benign neoplasm of lip     Cataract     Chronic ITP (idiopathic thrombocytopenia)     Chronic pain after spinal surgery     Depression     Deviated nasal septum     Diverticulosis     GERD (gastroesophageal reflux disease)     Glaucoma     H/O bipolar disorder     History of facial fracture     Hypertrophy of nasal turbinates     Lip lesion     CARLOS ALBERTO (obstructive sleep apnea)     Spinal stenosis of cervical region

## 2024-03-08 NOTE — H&P PST ADULT - HISTORY OF PRESENT ILLNESS
63 year old male PMH Benign Essential HTN,  ITP, COVID-19, Benign Neoplasm Of Lip, Deviated Nasal Septum, Hypertrophy Of Nasal Turbinates; presents today for PST prior to Septoplasty - Bilateral           Pt notes he has had sinus/nasal congestion for the past 30 years, Notes "harder to breathe more out of left side of my nose and they picked up a cyst on the CT scan." Notes "I have to blow my nose a lot." Denies any significant sinus pressure or sinus pain however does note post-nasal drip. Following exam, review of DX studies and discussions with Dr Dumont regarding treatment options pt is electing for scheduled procedure.  63 year old male PMH Benign Essential HTN, Chronic ITP, CARLOS ALBERTO ( Wears Dental Mouthpiece), Bipolar Disorder, Glaucoma, Anxiety, Depression, GERD, Diverticulosis, Cataract, Spinal Stenosis Of Cervical Region, COVID-19; now with Benign Neoplasm Of Lip, Deviated Nasal Septum, Hypertrophy Of Nasal Turbinates; presents today for PST prior to Septoplasty - Bilateral  Submucous Resection Of Turbinates - Excision Of Benign Lip Lesion with Dr Ana Dumont on 3/20/2024.     Pt notes he has had sinus/nasal congestion for the past 30 years, Notes "harder to breathe more out of left side of my nose and they picked up a cyst on the CT scan." Notes "I have to blow my nose a lot." Denies any significant sinus pressure or sinus pain however does note post-nasal drip. Following exam, review of DX studies and discussions with Dr Dumont regarding treatment options pt is electing for scheduled procedure.

## 2024-03-08 NOTE — H&P PST ADULT - PATIENT OPTIMIZED PENDING
Medical Clearance scheduled with PCP Dr Calix on 3/13/2024 - Hematology clearance scheduled with Dr Corea on 3/14/2024

## 2024-03-08 NOTE — H&P PST ADULT - NSICDXFAMILYHX_GEN_ALL_CORE_FT
FAMILY HISTORY:  Father  Still living? No  Family history of Alzheimer's disease, Age at diagnosis: Age Unknown    Mother  Still living? No  Family history of colon cancer in mother, Age at diagnosis: Age Unknown    Sibling  Still living? Yes, Estimated age: 71-80  Family history of CLL (chronic lymphoid leukemia), Age at diagnosis: Age Unknown

## 2024-03-08 NOTE — H&P PST ADULT - LAST STRESS TEST
Notes last stress test was 2  years ago  Dr Villareral - Cardiologist [facility] [place]

## 2024-03-08 NOTE — H&P PST ADULT - GENERAL COMMENTS
Denies any travel in the last 2 weeks - any recent or known exposure to anyone with covid- denies any current covid symptoms

## 2024-03-11 ENCOUNTER — TRANSCRIPTION ENCOUNTER (OUTPATIENT)
Age: 64
End: 2024-03-11

## 2024-03-12 ENCOUNTER — TRANSCRIPTION ENCOUNTER (OUTPATIENT)
Age: 64
End: 2024-03-12

## 2024-03-14 PROBLEM — J34.3 HYPERTROPHY OF NASAL TURBINATES: Chronic | Status: ACTIVE | Noted: 2024-03-08

## 2024-03-14 PROBLEM — D10.0 BENIGN NEOPLASM OF LIP: Chronic | Status: ACTIVE | Noted: 2024-03-08

## 2024-03-14 PROBLEM — U07.1 COVID-19: Chronic | Status: ACTIVE | Noted: 2024-03-08

## 2024-03-14 PROBLEM — Z87.81 PERSONAL HISTORY OF (HEALED) TRAUMATIC FRACTURE: Chronic | Status: ACTIVE | Noted: 2024-03-08

## 2024-03-14 PROBLEM — H40.9 UNSPECIFIED GLAUCOMA: Chronic | Status: ACTIVE | Noted: 2024-03-08

## 2024-03-14 PROBLEM — M54.9 DORSALGIA, UNSPECIFIED: Chronic | Status: ACTIVE | Noted: 2024-03-08

## 2024-03-14 PROBLEM — Z86.59 PERSONAL HISTORY OF OTHER MENTAL AND BEHAVIORAL DISORDERS: Chronic | Status: ACTIVE | Noted: 2024-03-08

## 2024-03-19 ENCOUNTER — TRANSCRIPTION ENCOUNTER (OUTPATIENT)
Age: 64
End: 2024-03-19

## 2024-03-19 NOTE — ASU PATIENT PROFILE, ADULT - CAREGIVER RELATION TO PATIENT
Anesthesia Post-op Note    Patient: Sorin Hayes  Procedure(s) Performed: EGD  COLONOSCOPY  Anesthesia type: MAC    Vitals Value Taken Time   Temp 37C 03/12/24 1450   Pulse 77 03/12/24 1450   Resp 13 03/12/24 1450   SpO2 95 % 03/12/24 1450   BP 91/60 03/12/24 1450   Vitals shown include unvalidated device data. Patient Location: PACU Phase 1  Post-op Vital Signs:stable  Level of Consciousness: awake and alert  Respiratory Status: spontaneous ventilation  Cardiovascular stable  Hydration: euvolemic  Pain Management: adequately controlled  Handoff: Handoff to receiving clinician was performed and questions were answered  Vomiting: none  Nausea: None  Airway Patency:patent  Post-op Assessment: no complications and patient tolerated procedure well      No notable events documented. spouse

## 2024-03-19 NOTE — ASU PATIENT PROFILE, ADULT - PATIENT KNOW
I examined the infant and interviewed the parents at approx 1:30pm on 6/14/19. All questions answered. yes

## 2024-03-20 ENCOUNTER — TRANSCRIPTION ENCOUNTER (OUTPATIENT)
Age: 64
End: 2024-03-20

## 2024-03-20 ENCOUNTER — OUTPATIENT (OUTPATIENT)
Dept: OUTPATIENT SERVICES | Facility: HOSPITAL | Age: 64
LOS: 1 days | End: 2024-03-20
Payer: MEDICARE

## 2024-03-20 VITALS
RESPIRATION RATE: 12 BRPM | HEART RATE: 75 BPM | OXYGEN SATURATION: 98 % | DIASTOLIC BLOOD PRESSURE: 78 MMHG | SYSTOLIC BLOOD PRESSURE: 123 MMHG

## 2024-03-20 VITALS
DIASTOLIC BLOOD PRESSURE: 75 MMHG | OXYGEN SATURATION: 99 % | SYSTOLIC BLOOD PRESSURE: 123 MMHG | TEMPERATURE: 97 F | HEART RATE: 80 BPM | RESPIRATION RATE: 10 BRPM | WEIGHT: 201.94 LBS | HEIGHT: 71 IN

## 2024-03-20 DIAGNOSIS — Z98.890 OTHER SPECIFIED POSTPROCEDURAL STATES: Chronic | ICD-10-CM

## 2024-03-20 DIAGNOSIS — D10.0 BENIGN NEOPLASM OF LIP: ICD-10-CM

## 2024-03-20 DIAGNOSIS — M43.22 FUSION OF SPINE, CERVICAL REGION: Chronic | ICD-10-CM

## 2024-03-20 DIAGNOSIS — Z98.1 ARTHRODESIS STATUS: Chronic | ICD-10-CM

## 2024-03-20 DIAGNOSIS — J34.2 DEVIATED NASAL SEPTUM: ICD-10-CM

## 2024-03-20 DIAGNOSIS — J34.3 HYPERTROPHY OF NASAL TURBINATES: ICD-10-CM

## 2024-03-20 DIAGNOSIS — Z90.89 ACQUIRED ABSENCE OF OTHER ORGANS: Chronic | ICD-10-CM

## 2024-03-20 PROCEDURE — 88305 TISSUE EXAM BY PATHOLOGIST: CPT | Mod: 26

## 2024-03-20 PROCEDURE — 30520 REPAIR OF NASAL SEPTUM: CPT

## 2024-03-20 PROCEDURE — C1889: CPT

## 2024-03-20 PROCEDURE — 88305 TISSUE EXAM BY PATHOLOGIST: CPT

## 2024-03-20 PROCEDURE — 88300 SURGICAL PATH GROSS: CPT

## 2024-03-20 PROCEDURE — 30930 THER FX NASAL INF TURBINATE: CPT

## 2024-03-20 PROCEDURE — 88300 SURGICAL PATH GROSS: CPT | Mod: 26

## 2024-03-20 PROCEDURE — C9399: CPT

## 2024-03-20 PROCEDURE — 40812 EXCISE/REPAIR MOUTH LESION: CPT

## 2024-03-20 DEVICE — SHEET SILICONE .040-1.02 MM: Type: IMPLANTABLE DEVICE | Status: FUNCTIONAL

## 2024-03-20 RX ORDER — LITHIUM CARBONATE 300 MG/1
1 TABLET, EXTENDED RELEASE ORAL
Refills: 0 | DISCHARGE

## 2024-03-20 RX ORDER — LATANOPROST 0.05 MG/ML
1 SOLUTION/ DROPS OPHTHALMIC; TOPICAL
Refills: 0 | DISCHARGE

## 2024-03-20 RX ORDER — METOPROLOL TARTRATE 50 MG
1 TABLET ORAL
Refills: 0 | DISCHARGE

## 2024-03-20 RX ORDER — VENLAFAXINE HCL 75 MG
1 CAPSULE, EXT RELEASE 24 HR ORAL
Refills: 0 | DISCHARGE

## 2024-03-20 RX ORDER — SODIUM CHLORIDE 9 MG/ML
1000 INJECTION, SOLUTION INTRAVENOUS
Refills: 0 | Status: DISCONTINUED | OUTPATIENT
Start: 2024-03-20 | End: 2024-03-20

## 2024-03-20 RX ORDER — CLONAZEPAM 1 MG
0.5 TABLET ORAL
Qty: 0 | Refills: 0 | DISCHARGE

## 2024-03-20 RX ORDER — LAMOTRIGINE 25 MG/1
0 TABLET, ORALLY DISINTEGRATING ORAL
Refills: 0 | DISCHARGE

## 2024-03-20 RX ORDER — DORZOLAMIDE HYDROCHLORIDE TIMOLOL MALEATE 20; 5 MG/ML; MG/ML
1 SOLUTION/ DROPS OPHTHALMIC
Refills: 0 | DISCHARGE

## 2024-03-20 RX ORDER — ONDANSETRON 8 MG/1
4 TABLET, FILM COATED ORAL ONCE
Refills: 0 | Status: DISCONTINUED | OUTPATIENT
Start: 2024-03-20 | End: 2024-03-20

## 2024-03-20 RX ORDER — OXYCODONE AND ACETAMINOPHEN 5; 325 MG/1; MG/1
1 TABLET ORAL
Refills: 0 | DISCHARGE

## 2024-03-20 RX ORDER — HYDROMORPHONE HYDROCHLORIDE 2 MG/ML
0.5 INJECTION INTRAMUSCULAR; INTRAVENOUS; SUBCUTANEOUS
Refills: 0 | Status: DISCONTINUED | OUTPATIENT
Start: 2024-03-20 | End: 2024-03-20

## 2024-03-20 RX ORDER — OXYCODONE AND ACETAMINOPHEN 5; 325 MG/1; MG/1
1 TABLET ORAL
Qty: 28 | Refills: 0
Start: 2024-03-20 | End: 2024-03-26

## 2024-03-20 RX ORDER — QUETIAPINE FUMARATE 200 MG/1
1 TABLET, FILM COATED ORAL
Refills: 0 | DISCHARGE

## 2024-03-20 RX ADMIN — SODIUM CHLORIDE 75 MILLILITER(S): 9 INJECTION, SOLUTION INTRAVENOUS at 09:58

## 2024-03-20 NOTE — ASU DISCHARGE PLAN (ADULT/PEDIATRIC) - CARE PROVIDER_API CALL
Miguel Dumont.  Otolaryngology  [address] [place] [id number]  Phone: [phone]  Fax: [phone]  Follow Up Time:

## 2024-03-20 NOTE — BRIEF OPERATIVE NOTE - NSICDXBRIEFPREOP_GEN_ALL_CORE_FT
PRE-OP DIAGNOSIS:  Nasal septal deviation 20-Mar-2024 09:25:59  Miguel Dumont  Nasal turbinate hypertrophy 20-Mar-2024 09:26:24  Miguel Dumont  Lip lesion 20-Mar-2024 09:26:55  Miguel Dumont

## 2024-03-20 NOTE — ASU DISCHARGE PLAN (ADULT/PEDIATRIC) - NS MD DC FALL RISK RISK
For information on Fall & Injury Prevention, visit: [email or web address] OR  [email or web address] OR  https://www.cdc.gov/steadi/patient.html

## 2024-03-20 NOTE — BRIEF OPERATIVE NOTE - NSICDXBRIEFPOSTOP_GEN_ALL_CORE_FT
POST-OP DIAGNOSIS:  Nasal septal deviation 20-Mar-2024 09:27:25  Miguel Dumont  Nasal turbinate hypertrophy 20-Mar-2024 09:28:23  Miguel Dumont  Lip lesion 20-Mar-2024 09:28:42  Miguel Dumont

## 2024-03-20 NOTE — BRIEF OPERATIVE NOTE - NSICDXBRIEFPROCEDURE_GEN_ALL_CORE_FT
PROCEDURES:  Septoplasty with turbinate reduction 20-Mar-2024 09:25:11  Miguel Dumont  Excision of benign skin lesion (excluding skin tags) of lip, 3.1 to 3.5cm 20-Mar-2024 09:25:38  Miguel Dumont

## 2024-03-25 LAB — SURGICAL PATHOLOGY STUDY: SIGNIFICANT CHANGE UP

## 2024-03-30 ENCOUNTER — NON-APPOINTMENT (OUTPATIENT)
Age: 64
End: 2024-03-30

## 2024-04-04 ENCOUNTER — APPOINTMENT (OUTPATIENT)
Dept: DERMATOLOGY | Facility: CLINIC | Age: 64
End: 2024-04-04
Payer: MEDICARE

## 2024-04-04 PROCEDURE — 99213 OFFICE O/P EST LOW 20 MIN: CPT

## 2024-05-02 ENCOUNTER — APPOINTMENT (OUTPATIENT)
Dept: PULMONOLOGY | Facility: CLINIC | Age: 64
End: 2024-05-02

## 2024-07-26 ENCOUNTER — APPOINTMENT (OUTPATIENT)
Dept: PULMONOLOGY | Facility: CLINIC | Age: 64
End: 2024-07-26

## 2024-09-01 ENCOUNTER — NON-APPOINTMENT (OUTPATIENT)
Age: 64
End: 2024-09-01

## 2024-10-03 ENCOUNTER — APPOINTMENT (OUTPATIENT)
Dept: PULMONOLOGY | Facility: CLINIC | Age: 64
End: 2024-10-03
Payer: MEDICARE

## 2024-10-03 VITALS
BODY MASS INDEX: 28.42 KG/M2 | RESPIRATION RATE: 16 BRPM | HEIGHT: 71 IN | DIASTOLIC BLOOD PRESSURE: 70 MMHG | HEART RATE: 81 BPM | OXYGEN SATURATION: 98 % | SYSTOLIC BLOOD PRESSURE: 110 MMHG | WEIGHT: 203 LBS

## 2024-10-03 DIAGNOSIS — R93.89 ABNORMAL FINDINGS ON DIAGNOSTIC IMAGING OF OTHER SPECIFIED BODY STRUCTURES: ICD-10-CM

## 2024-10-03 PROCEDURE — G2211 COMPLEX E/M VISIT ADD ON: CPT

## 2024-10-03 PROCEDURE — 99214 OFFICE O/P EST MOD 30 MIN: CPT

## 2024-10-03 RX ORDER — LATANOPROST/PF 0.005 %
0.01 DROPS OPHTHALMIC (EYE)
Refills: 0 | Status: ACTIVE | COMMUNITY

## 2024-10-03 RX ORDER — MULTIVITAMIN/IRON/FOLIC ACID 18MG-0.4MG
TABLET ORAL
Refills: 0 | Status: ACTIVE | COMMUNITY

## 2024-10-03 RX ORDER — DORZOLAMIDE HYDROCHLORIDE TIMOLOL MALEATE 20; 5 MG/ML; MG/ML
SOLUTION/ DROPS OPHTHALMIC
Refills: 0 | Status: ACTIVE | COMMUNITY

## 2024-10-03 RX ORDER — OXYCODONE AND ACETAMINOPHEN 5; 325 MG/1; MG/1
5-325 TABLET ORAL
Refills: 0 | Status: ACTIVE | COMMUNITY

## 2024-10-03 RX ORDER — METOPROLOL SUCCINATE 25 MG/1
25 TABLET, EXTENDED RELEASE ORAL
Refills: 0 | Status: ACTIVE | COMMUNITY

## 2024-10-03 RX ORDER — TESTOSTERONE CYPIONATE 200 MG/ML
VIAL (ML) INTRAMUSCULAR
Refills: 0 | Status: ACTIVE | COMMUNITY

## 2024-10-03 RX ORDER — ARIPIPRAZOLE 2 MG/1
2 TABLET ORAL
Refills: 0 | Status: ACTIVE | COMMUNITY

## 2024-10-03 RX ORDER — LAMOTRIGINE 150 MG/1
150 TABLET ORAL
Refills: 0 | Status: ACTIVE | COMMUNITY

## 2024-10-03 RX ORDER — CHOLECALCIFEROL (VITAMIN D3) 1250 MCG
1.25 MG CAPSULE ORAL
Refills: 0 | Status: ACTIVE | COMMUNITY

## 2024-10-03 NOTE — CONSULT LETTER
[Dear  ___] : Dear  [unfilled], [Consult Letter:] : I had the pleasure of evaluating your patient, [unfilled]. [Please see my note below.] : Please see my note below. [Sincerely,] : Sincerely, [DrValentine  ___] : Dr. DAVILA [FreeTextEntry3] : Ge[facility] Dor[facility]\par  Pulmonary Critical Care\par  Director Pulmonary Division\par  Medical Director Respiratory Therapy\par  [facility]\par  \par

## 2024-10-03 NOTE — PROCEDURE
[FreeTextEntry1] : CT chest abdomen pelvis 8/21 ? granuloma RUL , few emphysematous blebs, no nodules quantiferon negative  CXR 9/24 mild increased markings spirometry , no change from 2021 [facility] records reviewed

## 2024-10-03 NOTE — HISTORY OF PRESENT ILLNESS
[Former] : former [TextBox_4] : doing well uses oral appliance ( AHI 12)- but bothered by use considering re trying cpap, has appt with [facility] no acute pulmonary complaints no fever, chills, chest pain now on Testosterone injections recent CXR , midl increase markings GSH MRI/CT head negative GSH- near syncope w/u negative saw ID, had fevers, resolved, has chronic night sweats [YearQuit] : 2001

## 2024-10-03 NOTE — PHYSICAL EXAM
[No Acute Distress] : no acute distress [Normal S1, S2] : normal s1, s2 [Normal Rate/Rhythm] : normal rate/rhythm [No Murmurs] : no murmurs [No Rubs] : no rubs [No Gallops] : no gallops [No Resp Distress] : no resp distress [No Acc Muscle Use] : no acc muscle use [Normal Rhythm and Effort] : normal rhythm and effort [Clear to Auscultation Bilaterally] : clear to auscultation bilaterally [Normal to Percussion] : normal to percussion [No Abnormalities] : no abnormalities [Normal Gait] : normal gait [No Clubbing] : no clubbing [No Cyanosis] : no cyanosis [No Edema] : no edema [FROM] : FROM [Normal Color/ Pigmentation] : normal color/ pigmentation [No Focal Deficits] : no focal deficits [Oriented x3] : oriented x3 [Normal Affect] : normal affect

## 2024-10-03 NOTE — DISCUSSION/SUMMARY
[FreeTextEntry1] :  ? Granuloma RUL, no suspicious nodules, few emphysema cyst, Quantiferon negative PFTs are normal, quit smoking 20 yrs ago, repeat at follow up No acute pulmonary complaints Lung exam is normal, normal sp02 Not anemic, followed by hematology for ITP, stable Cardiology Dr Villarreal CXR mild increased markings, will obtain CT chest ? recent viral illness or Sulfa related, now baseline Chronic night sweats x yrs, Quantiferon negative, will repeat CT scan as above Followed by ID, recent Tick panel, Babesia, Erlichia panel negative prn rescue inhaler 6 months or sooner if needed, will call with CT

## 2024-11-06 ENCOUNTER — APPOINTMENT (OUTPATIENT)
Dept: PULMONOLOGY | Facility: CLINIC | Age: 64
End: 2024-11-06

## 2025-01-10 ENCOUNTER — APPOINTMENT (OUTPATIENT)
Dept: DERMATOLOGY | Facility: CLINIC | Age: 65
End: 2025-01-10
Payer: MEDICARE

## 2025-01-10 PROCEDURE — 17000 DESTRUCT PREMALG LESION: CPT

## 2025-01-10 PROCEDURE — 17003 DESTRUCT PREMALG LES 2-14: CPT

## 2025-01-10 PROCEDURE — 99214 OFFICE O/P EST MOD 30 MIN: CPT | Mod: 25

## 2025-03-04 ENCOUNTER — APPOINTMENT (OUTPATIENT)
Dept: DERMATOLOGY | Facility: CLINIC | Age: 65
End: 2025-03-04
Payer: MEDICARE

## 2025-03-04 PROCEDURE — 17003 DESTRUCT PREMALG LES 2-14: CPT | Mod: 59

## 2025-03-04 PROCEDURE — 17000 DESTRUCT PREMALG LESION: CPT

## 2025-03-04 PROCEDURE — 99213 OFFICE O/P EST LOW 20 MIN: CPT | Mod: 25

## 2025-04-02 ENCOUNTER — APPOINTMENT (OUTPATIENT)
Dept: PULMONOLOGY | Facility: CLINIC | Age: 65
End: 2025-04-02
Payer: MEDICARE

## 2025-04-02 VITALS — HEIGHT: 70 IN | BODY MASS INDEX: 29.63 KG/M2 | WEIGHT: 207 LBS

## 2025-04-02 PROCEDURE — 94010 BREATHING CAPACITY TEST: CPT

## 2025-04-19 ENCOUNTER — NON-APPOINTMENT (OUTPATIENT)
Age: 65
End: 2025-04-19

## 2025-04-21 ENCOUNTER — APPOINTMENT (OUTPATIENT)
Dept: PULMONOLOGY | Facility: CLINIC | Age: 65
End: 2025-04-21
Payer: MEDICARE

## 2025-04-21 VITALS
HEART RATE: 76 BPM | OXYGEN SATURATION: 98 % | BODY MASS INDEX: 29.92 KG/M2 | HEIGHT: 70 IN | RESPIRATION RATE: 16 BRPM | DIASTOLIC BLOOD PRESSURE: 70 MMHG | SYSTOLIC BLOOD PRESSURE: 112 MMHG | WEIGHT: 209 LBS

## 2025-04-21 DIAGNOSIS — G47.33 OBSTRUCTIVE SLEEP APNEA (ADULT) (PEDIATRIC): ICD-10-CM

## 2025-04-21 DIAGNOSIS — J44.9 CHRONIC OBSTRUCTIVE PULMONARY DISEASE, UNSPECIFIED: ICD-10-CM

## 2025-04-21 PROCEDURE — G2211 COMPLEX E/M VISIT ADD ON: CPT

## 2025-04-21 PROCEDURE — 99214 OFFICE O/P EST MOD 30 MIN: CPT

## 2025-04-21 RX ORDER — VENLAFAXINE HYDROCHLORIDE 75 MG/1
75 TABLET, EXTENDED RELEASE ORAL
Refills: 0 | Status: ACTIVE | COMMUNITY
